# Patient Record
Sex: FEMALE | Race: BLACK OR AFRICAN AMERICAN | NOT HISPANIC OR LATINO | Employment: FULL TIME | URBAN - METROPOLITAN AREA
[De-identification: names, ages, dates, MRNs, and addresses within clinical notes are randomized per-mention and may not be internally consistent; named-entity substitution may affect disease eponyms.]

---

## 2020-12-17 ENCOUNTER — OFFICE VISIT (OUTPATIENT)
Dept: FAMILY MEDICINE | Facility: CLINIC | Age: 30
End: 2020-12-17
Payer: COMMERCIAL

## 2020-12-17 VITALS
TEMPERATURE: 97 F | DIASTOLIC BLOOD PRESSURE: 70 MMHG | SYSTOLIC BLOOD PRESSURE: 102 MMHG | HEIGHT: 62 IN | HEART RATE: 77 BPM | BODY MASS INDEX: 27.1 KG/M2 | OXYGEN SATURATION: 98 % | WEIGHT: 147.25 LBS

## 2020-12-17 DIAGNOSIS — Z00.00 ANNUAL PHYSICAL EXAM: Primary | ICD-10-CM

## 2020-12-17 DIAGNOSIS — M54.9 BACK PAIN, UNSPECIFIED BACK LOCATION, UNSPECIFIED BACK PAIN LATERALITY, UNSPECIFIED CHRONICITY: ICD-10-CM

## 2020-12-17 DIAGNOSIS — Z11.59 SCREENING FOR VIRAL DISEASE: ICD-10-CM

## 2020-12-17 DIAGNOSIS — R51.9 NONINTRACTABLE EPISODIC HEADACHE, UNSPECIFIED HEADACHE TYPE: ICD-10-CM

## 2020-12-17 DIAGNOSIS — M54.9 DORSALGIA, UNSPECIFIED: ICD-10-CM

## 2020-12-17 DIAGNOSIS — J30.9 ALLERGIC RHINITIS, UNSPECIFIED SEASONALITY, UNSPECIFIED TRIGGER: ICD-10-CM

## 2020-12-17 PROCEDURE — 1125F AMNT PAIN NOTED PAIN PRSNT: CPT | Mod: S$GLB,,, | Performed by: INTERNAL MEDICINE

## 2020-12-17 PROCEDURE — 99204 OFFICE O/P NEW MOD 45 MIN: CPT | Mod: 25,S$GLB,, | Performed by: INTERNAL MEDICINE

## 2020-12-17 PROCEDURE — 99204 PR OFFICE/OUTPT VISIT, NEW, LEVL IV, 45-59 MIN: ICD-10-PCS | Mod: 25,S$GLB,, | Performed by: INTERNAL MEDICINE

## 2020-12-17 PROCEDURE — 96372 PR INJECTION,THERAP/PROPH/DIAG2ST, IM OR SUBCUT: ICD-10-PCS | Mod: S$GLB,,, | Performed by: INTERNAL MEDICINE

## 2020-12-17 PROCEDURE — 99999 PR PBB SHADOW E&M-NEW PATIENT-LVL III: CPT | Mod: PBBFAC,,, | Performed by: INTERNAL MEDICINE

## 2020-12-17 PROCEDURE — 99999 PR PBB SHADOW E&M-NEW PATIENT-LVL III: ICD-10-PCS | Mod: PBBFAC,,, | Performed by: INTERNAL MEDICINE

## 2020-12-17 PROCEDURE — 3008F PR BODY MASS INDEX (BMI) DOCUMENTED: ICD-10-PCS | Mod: CPTII,S$GLB,, | Performed by: INTERNAL MEDICINE

## 2020-12-17 PROCEDURE — 1125F PR PAIN SEVERITY QUANTIFIED, PAIN PRESENT: ICD-10-PCS | Mod: S$GLB,,, | Performed by: INTERNAL MEDICINE

## 2020-12-17 PROCEDURE — 96372 THER/PROPH/DIAG INJ SC/IM: CPT | Mod: S$GLB,,, | Performed by: INTERNAL MEDICINE

## 2020-12-17 PROCEDURE — 3008F BODY MASS INDEX DOCD: CPT | Mod: CPTII,S$GLB,, | Performed by: INTERNAL MEDICINE

## 2020-12-17 RX ORDER — FLUTICASONE PROPIONATE 50 MCG
1 SPRAY, SUSPENSION (ML) NASAL DAILY
Qty: 16 G | Refills: 11 | OUTPATIENT
Start: 2020-12-17 | End: 2023-02-01

## 2020-12-17 RX ORDER — TRIAMCINOLONE ACETONIDE 40 MG/ML
40 INJECTION, SUSPENSION INTRA-ARTICULAR; INTRAMUSCULAR ONCE
Status: COMPLETED | OUTPATIENT
Start: 2020-12-17 | End: 2020-12-17

## 2020-12-17 RX ADMIN — TRIAMCINOLONE ACETONIDE 40 MG: 40 INJECTION, SUSPENSION INTRA-ARTICULAR; INTRAMUSCULAR at 09:12

## 2020-12-17 NOTE — PROGRESS NOTES
[facility] Note    Chief Complaint      Chief Complaint   Patient presents with    Back Pain     med back mostly left side    Headache     every other day       History of Present Illness      Jd Dwyer is a 30 y.o. female with no chronic conditions who presents today for: establish care and complaints of back pain and headache.  Complains of left lower back pain, nonradiating, present for the past few months, lasts throughout the day and can last days at a time when exacerbated, occurs most days.  Has tried tylenol and ibuprofen which has not been effective even in short term.  No inciting injury has been recalled.  Has occurred years ago, but resolved spontaneously until recently.    Complains of frontal headaches, throbbing in character, occurs every other day, not associated with visual disturbance, nausea, vomiting, sensitivity to light or sound.  Temporarily improved with tylenol and advil but recurs.  Present for years.  Does not take anything for allergic rhinitis.  Occasional itchy eyes but denies runny nose, sneezing, postnasal drip, cough, ear fullness.    Diet: Prepares own foods mostly.  Does fairly well limiting fatty foods and carbs.  Drinks plenty water.  Exercise: stays active during the day.     Denies drinking and driving, drinking more than 4 drinks on occasion, drug use.     Flu shot defers today.  TdAP UTD 2016.    PAP smear UTD Dr. Valdivia.    Past Medical History:  History reviewed. No pertinent past medical history.    Past Surgical History:   has a past surgical history that includes  section.    Family History:  family history includes No Known Problems in her father and mother.     Social History:  Social History     Tobacco Use    Smoking status: Never Smoker   Substance Use Topics    Alcohol use: Yes     Comment: occasionally    Drug use: No       I personally reviewed all past medical, surgical, social and family history.    Review of Systems  "  Constitutional: Negative for chills, fever and malaise/fatigue.   Respiratory: Negative for shortness of breath.    Cardiovascular: Negative for chest pain.   Gastrointestinal: Negative for constipation, diarrhea, nausea and vomiting.   Skin: Negative for rash.   Neurological: Negative for weakness.   All other systems reviewed and are negative.       Medications:  Outpatient Encounter Medications as of 12/17/2020   Medication Sig Dispense Refill    ibuprofen (ADVIL,MOTRIN) 600 MG tablet Take 1 tablet (600 mg total) by mouth every 6 (six) hours as needed for Pain. (Patient not taking: Reported on 12/17/2020) 20 tablet 0    naproxen (NAPROSYN) 500 MG tablet Take 1 tablet (500 mg total) by mouth 2 (two) times daily as needed. (Patient not taking: Reported on 12/17/2020) 60 tablet 0    prenatal vit no.91-iron-FA-dha 28 mg iron- 975 mcg-200 mg Cmpk Take 1 tablet by mouth once daily.       Facility-Administered Encounter Medications as of 12/17/2020   Medication Dose Route Frequency Provider Last Rate Last Dose    triamcinolone acetonide injection 40 mg  40 mg Intramuscular Once Hernan Glasgow MD           Allergies:  Review of patient's allergies indicates:  No Known Allergies    Health Maintenance:  Immunization History   Administered Date(s) Administered    Rho (D) Immune Globulin - IM 03/17/2016      Health Maintenance   Topic Date Due    Hepatitis C Screening  1990    Lipid Panel  1988    TETANUS VACCINE  06/09/2006        Physical Exam      Vital Signs  Temp: 97.1 °F (36.2 °C)  Temp src: Oral  Pulse: 77  SpO2: 98 %  BP: 102/70  BP Location: Right arm  Patient Position: Sitting  Pain Score:   7  Pain Loc: Back  Height and Weight  Height: 5' 2" (157.5 cm)  Weight: 66.8 kg (147 lb 4.3 oz)  BSA (Calculated - sq m): 1.71 sq meters  BMI (Calculated): 26.9  Weight in (lb) to have BMI = 25: 136.4]    Physical Exam  Vitals signs reviewed.   Constitutional:       Appearance: She is well-developed. "   HENT:      Head: Normocephalic and atraumatic.      Right Ear: External ear normal.      Left Ear: External ear normal.   Eyes:      Conjunctiva/sclera: Conjunctivae normal.      Pupils: Pupils are equal, round, and reactive to light.   Neck:      Vascular: No carotid bruit.   Cardiovascular:      Rate and Rhythm: Normal rate and regular rhythm.      Heart sounds: Normal heart sounds. No murmur.   Pulmonary:      Effort: Pulmonary effort is normal.      Breath sounds: Normal breath sounds. No wheezing or rales.   Abdominal:      General: Bowel sounds are normal. There is no distension.      Palpations: Abdomen is soft.      Tenderness: There is no abdominal tenderness.          Laboratory:  CBC:      CMP:        Invalid input(s): CREATININ  URINALYSIS:       LIPIDS:      TSH:      A1C:        Assessment/Plan     Jd Dwyer is a 30 y.o.female with:    1. Back pain, unspecified back location, unspecified back pain laterality, unspecified chronicity  - triamcinolone acetonide injection 40 mg  Will check Xray and give kenalog shot.  Pending results, will likely refer to PT for stretching exercise.  2. Nonintractable episodic headache, unspecified headache type  - triamcinolone acetonide injection 40 mg  Starting on nasal steroid for likely sinus headaches.  4. Annual physical exam  - CBC Auto Differential; Future  - Comprehensive Metabolic Panel; Future  - Hepatitis C Antibody; Future  - Lipid Panel; Future  - TSH; Future  - T4, Free; Future    5. Screening for viral disease  - Hepatitis C Antibody; Future           Chronic conditions status updated as per HPI.  Other than changes above, cont current medications and maintain follow up with specialists.  Return to clinic in 12 months.    Hernan Glasgow MD  [facility]

## 2020-12-18 ENCOUNTER — TELEPHONE (OUTPATIENT)
Dept: ADMINISTRATIVE | Facility: HOSPITAL | Age: 30
End: 2020-12-18

## 2020-12-21 ENCOUNTER — PATIENT MESSAGE (OUTPATIENT)
Dept: PRIMARY CARE CLINIC | Facility: CLINIC | Age: 30
End: 2020-12-21

## 2020-12-30 ENCOUNTER — PATIENT MESSAGE (OUTPATIENT)
Dept: FAMILY MEDICINE | Facility: CLINIC | Age: 30
End: 2020-12-30

## 2022-01-10 ENCOUNTER — PATIENT MESSAGE (OUTPATIENT)
Dept: ADMINISTRATIVE | Facility: HOSPITAL | Age: 32
End: 2022-01-10
Payer: COMMERCIAL

## 2022-05-05 ENCOUNTER — PATIENT OUTREACH (OUTPATIENT)
Dept: ADMINISTRATIVE | Facility: HOSPITAL | Age: 32
End: 2022-05-05
Payer: COMMERCIAL

## 2022-05-05 NOTE — PROGRESS NOTES
Health Maintenance Due   Topic Date Due    Cervical Cancer Screening  Never done    TETANUS VACCINE  Never done     Triggered LINKS. Updated Care Everywhere. Checked for outside lab results in [facility] [employer]. Scanned & hyperlinked outside lab results for lipid panel into . Chart review completed.

## 2022-06-24 LAB
HUMAN PAPILLOMAVIRUS (HPV): NORMAL
PAP RECOMMENDATION EXT: NORMAL

## 2022-08-10 ENCOUNTER — OFFICE VISIT (OUTPATIENT)
Dept: INTERNAL MEDICINE | Facility: CLINIC | Age: 32
End: 2022-08-10
Payer: COMMERCIAL

## 2022-08-10 VITALS
OXYGEN SATURATION: 99 % | SYSTOLIC BLOOD PRESSURE: 110 MMHG | HEIGHT: 62 IN | DIASTOLIC BLOOD PRESSURE: 82 MMHG | TEMPERATURE: 99 F | BODY MASS INDEX: 27.63 KG/M2 | HEART RATE: 88 BPM | WEIGHT: 150.13 LBS

## 2022-08-10 DIAGNOSIS — F41.9 ANXIETY: Primary | ICD-10-CM

## 2022-08-10 DIAGNOSIS — R51.9 NONINTRACTABLE EPISODIC HEADACHE, UNSPECIFIED HEADACHE TYPE: ICD-10-CM

## 2022-08-10 DIAGNOSIS — M54.50 CHRONIC BILATERAL LOW BACK PAIN WITHOUT SCIATICA: ICD-10-CM

## 2022-08-10 DIAGNOSIS — M54.2 CHRONIC NECK PAIN: ICD-10-CM

## 2022-08-10 DIAGNOSIS — G89.29 CHRONIC BILATERAL LOW BACK PAIN WITHOUT SCIATICA: ICD-10-CM

## 2022-08-10 DIAGNOSIS — G89.29 CHRONIC NECK PAIN: ICD-10-CM

## 2022-08-10 PROCEDURE — 99214 PR OFFICE/OUTPT VISIT, EST, LEVL IV, 30-39 MIN: ICD-10-PCS | Mod: S$GLB,,, | Performed by: INTERNAL MEDICINE

## 2022-08-10 PROCEDURE — 99999 PR PBB SHADOW E&M-EST. PATIENT-LVL IV: ICD-10-PCS | Mod: PBBFAC,,, | Performed by: INTERNAL MEDICINE

## 2022-08-10 PROCEDURE — 3074F PR MOST RECENT SYSTOLIC BLOOD PRESSURE < 130 MM HG: ICD-10-PCS | Mod: CPTII,S$GLB,, | Performed by: INTERNAL MEDICINE

## 2022-08-10 PROCEDURE — 99999 PR PBB SHADOW E&M-EST. PATIENT-LVL IV: CPT | Mod: PBBFAC,,, | Performed by: INTERNAL MEDICINE

## 2022-08-10 PROCEDURE — 1159F PR MEDICATION LIST DOCUMENTED IN MEDICAL RECORD: ICD-10-PCS | Mod: CPTII,S$GLB,, | Performed by: INTERNAL MEDICINE

## 2022-08-10 PROCEDURE — 3079F PR MOST RECENT DIASTOLIC BLOOD PRESSURE 80-89 MM HG: ICD-10-PCS | Mod: CPTII,S$GLB,, | Performed by: INTERNAL MEDICINE

## 2022-08-10 PROCEDURE — 3008F PR BODY MASS INDEX (BMI) DOCUMENTED: ICD-10-PCS | Mod: CPTII,S$GLB,, | Performed by: INTERNAL MEDICINE

## 2022-08-10 PROCEDURE — 99214 OFFICE O/P EST MOD 30 MIN: CPT | Mod: S$GLB,,, | Performed by: INTERNAL MEDICINE

## 2022-08-10 PROCEDURE — 1159F MED LIST DOCD IN RCRD: CPT | Mod: CPTII,S$GLB,, | Performed by: INTERNAL MEDICINE

## 2022-08-10 PROCEDURE — 3074F SYST BP LT 130 MM HG: CPT | Mod: CPTII,S$GLB,, | Performed by: INTERNAL MEDICINE

## 2022-08-10 PROCEDURE — 3008F BODY MASS INDEX DOCD: CPT | Mod: CPTII,S$GLB,, | Performed by: INTERNAL MEDICINE

## 2022-08-10 PROCEDURE — 3079F DIAST BP 80-89 MM HG: CPT | Mod: CPTII,S$GLB,, | Performed by: INTERNAL MEDICINE

## 2022-08-10 RX ORDER — METHYLPREDNISOLONE 4 MG/1
TABLET ORAL
Qty: 21 EACH | Refills: 0 | Status: SHIPPED | OUTPATIENT
Start: 2022-08-10 | End: 2022-08-31

## 2022-08-10 RX ORDER — VENLAFAXINE HYDROCHLORIDE 37.5 MG/1
37.5 CAPSULE, EXTENDED RELEASE ORAL DAILY
Qty: 30 CAPSULE | Refills: 11 | Status: SHIPPED | OUTPATIENT
Start: 2022-08-10 | End: 2023-04-26

## 2022-08-10 NOTE — PROGRESS NOTES
[facility] Note    Chief Complaint      Chief Complaint   Patient presents with    Headache     Mainly on the left side    Back Pain       History of Present Illness      Jd Dwyer is a 32 y.o. female with chronic conditions of chronic neck and back pain, headaches who presents today for: back pain and headaches.  Had lumbar xray in  which was unremarkable.  Has been having diffuse back pain, mild to moderate in intensity, constant for the most part.  Seems worse on left side.  Has also had onset of right hip and knee pain, possible from compensating for left back and neck pain.  Recently started with left frontal headaches.  Denies nasal congestion, post nasal drip, cough.  Complains of increased anxiety, restlessness and insomnia    Past Medical History:  History reviewed. No pertinent past medical history.    Past Surgical History:   has a past surgical history that includes  section.    Family History:  family history includes Diabetes in her maternal grandmother; No Known Problems in her father and mother.     Social History:  Social History     Tobacco Use    Smoking status: Never Smoker    Smokeless tobacco: Never Used   Substance Use Topics    Alcohol use: Yes     Alcohol/week: 6.0 standard drinks     Types: 3 Glasses of wine, 3 Standard drinks or equivalent per week     Comment: occasionally    Drug use: No       I personally reviewed all past medical, surgical, social and family history.    Review of Systems   Constitutional: Negative for fever and weight loss.   Cardiovascular: Positive for chest pain.   Gastrointestinal: Negative for abdominal pain.   Genitourinary: Negative for dysuria and hematuria.   Neurological: Positive for headaches. Negative for tingling and weakness.        Medications:  Outpatient Encounter Medications as of 8/10/2022   Medication Sig Dispense Refill    fluticasone propionate (FLONASE) 50 mcg/actuation nasal spray 1 spray (50 mcg  "total) by Each Nostril route once daily. 16 g 11    ibuprofen (ADVIL,MOTRIN) 600 MG tablet Take 1 tablet (600 mg total) by mouth every 6 (six) hours as needed for Pain. (Patient not taking: Reported on 12/17/2020) 20 tablet 0    methylPREDNISolone (MEDROL DOSEPACK) 4 mg tablet use as directed 21 each 0    naproxen (NAPROSYN) 500 MG tablet Take 1 tablet (500 mg total) by mouth 2 (two) times daily as needed. (Patient not taking: Reported on 12/17/2020) 60 tablet 0    prenatal vit no.91-iron-FA-dha 28 mg iron- 975 mcg-200 mg Cmpk Take 1 tablet by mouth once daily.      venlafaxine (EFFEXOR-XR) 37.5 MG 24 hr capsule Take 1 capsule (37.5 mg total) by mouth once daily. 30 capsule 11     No facility-administered encounter medications on file as of 8/10/2022.       Allergies:  Review of patient's allergies indicates:  No Known Allergies    Health Maintenance:  Immunization History   Administered Date(s) Administered    COVID-19, MRNA, LN-S, PF (Pfizer) (Gray Cap) 03/10/2022    COVID-19, MRNA, LN-S, PF (Pfizer) (Purple Cap) 08/19/2021, 10/07/2021    Rho (D) Immune Globulin - IM 03/17/2016      Health Maintenance   Topic Date Due    TETANUS VACCINE  Never done    Hepatitis C Screening  Completed    Lipid Panel  Completed        Physical Exam      Vital Signs  Temp: 98.8 °F (37.1 °C)  Temp src: Oral  Pulse: 88  SpO2: 99 %  BP: 110/82  BP Location: Right arm  Patient Position: Sitting  Pain Score:   8  Height and Weight  Height: 5' 2" (157.5 cm)  Weight: 68.1 kg (150 lb 2.1 oz)  BSA (Calculated - sq m): 1.73 sq meters  BMI (Calculated): 27.5  Weight in (lb) to have BMI = 25: 136.4]    Physical Exam  Vitals reviewed.   Constitutional:       Appearance: She is well-developed.   HENT:      Head: Normocephalic and atraumatic.      Right Ear: External ear normal.      Left Ear: External ear normal.   Cardiovascular:      Rate and Rhythm: Normal rate and regular rhythm.      Heart sounds: Normal heart sounds. No murmur " heard.  Pulmonary:      Effort: Pulmonary effort is normal.      Breath sounds: Normal breath sounds. No wheezing or rales.   Abdominal:      General: Bowel sounds are normal. There is no distension.      Palpations: Abdomen is soft.      Tenderness: There is no abdominal tenderness.          Laboratory:  CBC:  Recent Labs   Lab 12/17/20  0956   WBC 6.43   RBC 4.58   Hemoglobin 13.1   Hematocrit 42.3   Platelets 264   MCV 92   MCH 28.6   MCHC 31.0 L     CMP:  Recent Labs   Lab 12/17/20  0956   Glucose 103   Calcium 10.0   Albumin 4.5   Total Protein 8.5 H   Sodium 145   Potassium 5.1   CO2 28   Chloride 107   BUN 16   Alkaline Phosphatase 54   ALT 15   AST 34   Total Bilirubin 0.4     URINALYSIS:       LIPIDS:  Recent Labs   Lab 12/17/20  0956   TSH 1.030   HDL 63   Cholesterol 171   Triglycerides 67   LDL Cholesterol 94.6   HDL/Cholesterol Ratio 36.8   Non-HDL Cholesterol 108   Total Cholesterol/HDL Ratio 2.7     TSH:  Recent Labs   Lab 12/17/20  0956   TSH 1.030     A1C:        Assessment/Plan     Jd Dwyer is a 32 y.o.female with:    1. Anxiety  - venlafaxine (EFFEXOR-XR) 37.5 MG 24 hr capsule; Take 1 capsule (37.5 mg total) by mouth once daily.  Dispense: 30 capsule; Refill: 11  Start effexor.  May need to increase dose.  Monitor for side effects.  2. Nonintractable episodic headache, unspecified headache type  - methylPREDNISolone (MEDROL DOSEPACK) 4 mg tablet; use as directed  Dispense: 21 each; Refill: 0  Likely sinusitis.  Sending in medrol  3. Chronic bilateral low back pain without sciatica  - Ambulatory referral/consult to Physical/Occupational Therapy; Future  4. Chronic neck pain  - Ambulatory referral/consult to Physical/Occupational Therapy; Future  - X-Ray Cervical Spine Complete 5 view; Future  Check Xray cervical spine.  Referring to PT for evaluation.     Chronic conditions status updated as per HPI.  Other than changes above, cont current medications and maintain follow up with  specialists.  No follow-ups on file.    Future Appointments   Date Time Provider Department Center   8/10/2022  4:15 PM DES[facility] OIC XR1 DESH XRAY Rob Glasgow MD  [facility]                  Answers for HPI/ROS submitted by the patient on 8/8/2022  Chronicity: chronic  Onset: more than 1 year ago  Frequency: constantly  Progression since onset: unchanged  Pain location: lumbar spine, costovertebral angle  Pain quality: aching  Radiates to: right knee, right thigh  Pain - numeric: 8/10  Pain is: the same all the time  Aggravated by: position, lying down, sitting, standing, stress  Stiffness is present: at night  bladder incontinence: No  bowel incontinence: No  leg pain: Yes  numbness: Yes  paresis: No  paresthesias: No  pelvic pain: No  perianal numbness: No  genital pain: No  Pain severity: severe  Improvement on treatment: no relief

## 2022-08-11 ENCOUNTER — PATIENT MESSAGE (OUTPATIENT)
Dept: INTERNAL MEDICINE | Facility: CLINIC | Age: 32
End: 2022-08-11
Payer: COMMERCIAL

## 2023-01-19 LAB
CHOLEST SERPL-MSCNC: 165 MG/DL (ref 0–200)
HDLC SERPL-MCNC: 65 MG/DL (ref 35–70)
LDLC SERPL CALC-MCNC: 72 MG/DL (ref 0–160)
TRIGL SERPL-MCNC: 136 MG/DL (ref 40–160)

## 2023-02-01 ENCOUNTER — TELEPHONE (OUTPATIENT)
Dept: INTERNAL MEDICINE | Facility: CLINIC | Age: 33
End: 2023-02-01
Payer: COMMERCIAL

## 2023-02-01 ENCOUNTER — PATIENT OUTREACH (OUTPATIENT)
Dept: ADMINISTRATIVE | Facility: HOSPITAL | Age: 33
End: 2023-02-01
Payer: COMMERCIAL

## 2023-02-01 VITALS — SYSTOLIC BLOOD PRESSURE: 101 MMHG | DIASTOLIC BLOOD PRESSURE: 70 MMHG

## 2023-02-01 NOTE — PROGRESS NOTES
Health Maintenance Due   Topic Date Due    TETANUS VACCINE  Never done    Influenza Vaccine (1) Never done     Triggered LINKS and reconciled immunizations. Updated Care Everywhere. Imported outside lab results for lipid panel and BP reading received from [facility] into . Checked for outside lab results in [facility]. Imported outside pap smear results from [employer] into . Chart review completed.

## 2023-04-26 ENCOUNTER — OFFICE VISIT (OUTPATIENT)
Dept: PRIMARY CARE CLINIC | Facility: CLINIC | Age: 33
End: 2023-04-26
Payer: COMMERCIAL

## 2023-04-26 ENCOUNTER — TELEPHONE (OUTPATIENT)
Dept: PAIN MEDICINE | Facility: CLINIC | Age: 33
End: 2023-04-26
Payer: COMMERCIAL

## 2023-04-26 VITALS
HEIGHT: 62 IN | HEART RATE: 86 BPM | OXYGEN SATURATION: 98 % | BODY MASS INDEX: 27.75 KG/M2 | SYSTOLIC BLOOD PRESSURE: 120 MMHG | DIASTOLIC BLOOD PRESSURE: 70 MMHG | WEIGHT: 150.81 LBS

## 2023-04-26 DIAGNOSIS — F41.9 ANXIETY: ICD-10-CM

## 2023-04-26 DIAGNOSIS — G44.86 CERVICOGENIC HEADACHE: ICD-10-CM

## 2023-04-26 DIAGNOSIS — K21.9 GERD WITHOUT ESOPHAGITIS: ICD-10-CM

## 2023-04-26 DIAGNOSIS — R07.9 CHEST PAIN, UNSPECIFIED TYPE: ICD-10-CM

## 2023-04-26 DIAGNOSIS — M54.9 DORSALGIA, UNSPECIFIED: Primary | ICD-10-CM

## 2023-04-26 PROCEDURE — 1159F MED LIST DOCD IN RCRD: CPT | Mod: CPTII,S$GLB,, | Performed by: INTERNAL MEDICINE

## 2023-04-26 PROCEDURE — 1159F PR MEDICATION LIST DOCUMENTED IN MEDICAL RECORD: ICD-10-PCS | Mod: CPTII,S$GLB,, | Performed by: INTERNAL MEDICINE

## 2023-04-26 PROCEDURE — 99999 PR PBB SHADOW E&M-EST. PATIENT-LVL IV: CPT | Mod: PBBFAC,,, | Performed by: INTERNAL MEDICINE

## 2023-04-26 PROCEDURE — 3008F BODY MASS INDEX DOCD: CPT | Mod: CPTII,S$GLB,, | Performed by: INTERNAL MEDICINE

## 2023-04-26 PROCEDURE — 3074F PR MOST RECENT SYSTOLIC BLOOD PRESSURE < 130 MM HG: ICD-10-PCS | Mod: CPTII,S$GLB,, | Performed by: INTERNAL MEDICINE

## 2023-04-26 PROCEDURE — 3078F DIAST BP <80 MM HG: CPT | Mod: CPTII,S$GLB,, | Performed by: INTERNAL MEDICINE

## 2023-04-26 PROCEDURE — 99999 PR PBB SHADOW E&M-EST. PATIENT-LVL IV: ICD-10-PCS | Mod: PBBFAC,,, | Performed by: INTERNAL MEDICINE

## 2023-04-26 PROCEDURE — 99214 OFFICE O/P EST MOD 30 MIN: CPT | Mod: S$GLB,,, | Performed by: INTERNAL MEDICINE

## 2023-04-26 PROCEDURE — 3074F SYST BP LT 130 MM HG: CPT | Mod: CPTII,S$GLB,, | Performed by: INTERNAL MEDICINE

## 2023-04-26 PROCEDURE — 3078F PR MOST RECENT DIASTOLIC BLOOD PRESSURE < 80 MM HG: ICD-10-PCS | Mod: CPTII,S$GLB,, | Performed by: INTERNAL MEDICINE

## 2023-04-26 PROCEDURE — 99214 PR OFFICE/OUTPT VISIT, EST, LEVL IV, 30-39 MIN: ICD-10-PCS | Mod: S$GLB,,, | Performed by: INTERNAL MEDICINE

## 2023-04-26 PROCEDURE — 3008F PR BODY MASS INDEX (BMI) DOCUMENTED: ICD-10-PCS | Mod: CPTII,S$GLB,, | Performed by: INTERNAL MEDICINE

## 2023-04-26 RX ORDER — OMEPRAZOLE 40 MG/1
40 CAPSULE, DELAYED RELEASE ORAL EVERY MORNING
Qty: 30 CAPSULE | Refills: 1 | Status: SHIPPED | OUTPATIENT
Start: 2023-04-26 | End: 2024-03-20 | Stop reason: ALTCHOICE

## 2023-04-26 RX ORDER — TIZANIDINE 4 MG/1
4 TABLET ORAL EVERY 6 HOURS PRN
Qty: 30 TABLET | Refills: 0 | Status: SHIPPED | OUTPATIENT
Start: 2023-04-26 | End: 2024-03-26

## 2023-04-26 RX ORDER — ESCITALOPRAM OXALATE 10 MG/1
10 TABLET ORAL DAILY
Qty: 30 TABLET | Refills: 11 | Status: SHIPPED | OUTPATIENT
Start: 2023-04-26 | End: 2024-03-26

## 2023-04-26 NOTE — PROGRESS NOTES
[facility] Note    Chief Complaint      Chief Complaint   Patient presents with    Back Pain    Leg Pain     left    Headache    Gastroesophageal Reflux       History of Present Illness      Jd Dwyer is a 32 y.o. female with chronic conditions of chronic neck and back pain, headaches who presents today for: complaints.  Complains of persistent, intermittent neck pain and posterior headache.  Complains of persistent low back pain and left sciatica.  Has had imaging in the recent past for this.  Has had trial of PT without improvement.  Complains of epigastric discomfort radiating to the back, heartburn.    Past Medical History:  History reviewed. No pertinent past medical history.    Past Surgical History:   has a past surgical history that includes  section.    Family History:  family history includes Diabetes in her maternal grandmother; No Known Problems in her father and mother.     Social History:  Social History     Tobacco Use    Smoking status: Never    Smokeless tobacco: Never   Substance Use Topics    Alcohol use: Yes     Alcohol/week: 6.0 standard drinks     Types: 3 Glasses of wine, 3 Standard drinks or equivalent per week     Comment: occasionally    Drug use: No       I personally reviewed all past medical, surgical, social and family history.    Review of Systems   Constitutional:  Negative for chills, fever and malaise/fatigue.   Respiratory:  Negative for shortness of breath.    Cardiovascular:  Negative for chest pain.   Gastrointestinal:  Negative for constipation, diarrhea, nausea and vomiting.   Skin:  Negative for rash.   Neurological:  Negative for weakness.   All other systems reviewed and are negative.     Medications:  Outpatient Encounter Medications as of 2023   Medication Sig Dispense Refill    ibuprofen (ADVIL,MOTRIN) 600 MG tablet Take 1 tablet (600 mg total) by mouth every 6 (six) hours as needed for Pain. (Patient not taking: Reported on  "12/17/2020) 20 tablet 0    omeprazole (PRILOSEC) 40 MG capsule Take 1 capsule (40 mg total) by mouth every morning. 30 capsule 1    prenatal vit no.91-iron-FA-dha 28 mg iron- 975 mcg-200 mg Cmpk Take 1 tablet by mouth once daily.      tiZANidine (ZANAFLEX) 4 MG tablet Take 1 tablet (4 mg total) by mouth every 6 (six) hours as needed. 30 tablet 0    venlafaxine (EFFEXOR-XR) 37.5 MG 24 hr capsule Take 1 capsule (37.5 mg total) by mouth once daily. 30 capsule 11     No facility-administered encounter medications on file as of 4/26/2023.       Allergies:  Review of patient's allergies indicates:  No Known Allergies    Health Maintenance:  Immunization History   Administered Date(s) Administered    COVID-19, MRNA, LN-S, PF (Pfizer) (Gray Cap) 03/10/2022    COVID-19, MRNA, LN-S, PF (Pfizer) (Purple Cap) 08/19/2021, 10/07/2021    COVID-19, mRNA, LNP-S, bivalent booster, PF (Moderna Omicron) 10/21/2022    Rho (D) Immune Globulin - IM 03/17/2016      Health Maintenance   Topic Date Due    TETANUS VACCINE  Never done    Hepatitis C Screening  Completed    Lipid Panel  Completed        Physical Exam      Vital Signs  Pulse: 86  SpO2: 98 %  BP: 120/70  BP Location: Right arm  Patient Position: Sitting  Pain Score:   9  Pain Loc: Generalized  Height and Weight  Height: 5' 2" (157.5 cm)  Weight: 68.4 kg (150 lb 12.7 oz)  BSA (Calculated - sq m): 1.73 sq meters  BMI (Calculated): 27.6  Weight in (lb) to have BMI = 25: 136.4]    Physical Exam  Vitals reviewed.   Constitutional:       Appearance: She is well-developed.   HENT:      Head: Normocephalic and atraumatic.      Right Ear: External ear normal.      Left Ear: External ear normal.   Cardiovascular:      Rate and Rhythm: Normal rate and regular rhythm.      Heart sounds: Normal heart sounds. No murmur heard.  Pulmonary:      Effort: Pulmonary effort is normal.      Breath sounds: Normal breath sounds. No wheezing or rales.   Abdominal:      General: Bowel sounds are normal. " There is no distension.      Palpations: Abdomen is soft.      Tenderness: There is no abdominal tenderness.        Laboratory:  CBC:  Recent Labs   Lab 12/17/20  0956   WBC 6.43   RBC 4.58   Hemoglobin 13.1   Hematocrit 42.3   Platelets 264   MCV 92   MCH 28.6   MCHC 31.0 L     CMP:  Recent Labs   Lab 12/17/20  0956   Glucose 103   Calcium 10.0   Albumin 4.5   Total Protein 8.5 H   Sodium 145   Potassium 5.1   CO2 28   Chloride 107   BUN 16   Alkaline Phosphatase 54   ALT 15   AST 34   Total Bilirubin 0.4     URINALYSIS:       LIPIDS:  Recent Labs   Lab 12/17/20  0956 01/19/23  0000   TSH 1.030  --    HDL 63 65   Cholesterol 171 165   Triglycerides 67 136   LDL Calculated  --  72   LDL Cholesterol 94.6  --    HDL/Cholesterol Ratio 36.8  --    Non-HDL Cholesterol 108  --    Total Cholesterol/HDL Ratio 2.7  --      TSH:  Recent Labs   Lab 12/17/20  0956   TSH 1.030     A1C:        Assessment/Plan     Jd Dwyer is a 32 y.o.female with:    1. Dorsalgia, unspecified  - MRI Lumbar Spine Without Contrast; Future  - Ambulatory referral/consult to Pain Clinic; Future  Reviewed previous xrays.  Will get MRI to set up for pain management evaluation.  2. Chest pain, unspecified type  LIkely GERD.  See below  3. Cervicogenic headache  - tiZANidine (ZANAFLEX) 4 MG tablet; Take 1 tablet (4 mg total) by mouth every 6 (six) hours as needed.  Dispense: 30 tablet; Refill: 0  Start on muscle relaxer trial to see if reducing tension improves symptoms.  Also recommend stretching.   4. GERD without esophagitis  - omeprazole (PRILOSEC) 40 MG capsule; Take 1 capsule (40 mg total) by mouth every morning.  Dispense: 30 capsule; Refill: 1  STart on 4-6 week course of PPI to see if symptoms improve.    Chronic conditions status updated as per HPI.  Other than changes above, cont current medications and maintain follow up with specialists.  No follow-ups on file.    No future appointments.    Hernan Glasgow MD  [facility]                Answers submitted by the patient for this visit:  Back Pain Questionnaire (Submitted on 4/26/2023)  Chief Complaint: Back pain  Chronicity: recurrent  Onset: more than 1 year ago  Frequency: constantly  Progression since onset: waxing and waning  Pain location: lumbar spine  Pain quality: aching, burning, shooting  Radiates to: left foot, left knee, left thigh  Pain - numeric: 9/10  Pain is: the same all the time  Aggravated by: position, lying down, sitting, standing, stress  Stiffness is present: all day  bladder incontinence: No  bowel incontinence: No  leg pain: Yes  numbness: Yes  paresis: No  paresthesias: No  pelvic pain: Yes  perianal numbness: No  genital pain: No  Pain severity: severe  Improvement on treatment: no relief

## 2023-04-26 NOTE — TELEPHONE ENCOUNTER
Contacted pt. In regards to appointment scheduled for 05/04/2023, appointment needs to be scheduled, Dr. Mccarthy will be in surgery on this day.  Contacted pt. Pt was unavaialble, left vm to contact [facility] for rescheduling.

## 2023-06-28 ENCOUNTER — HOSPITAL ENCOUNTER (EMERGENCY)
Facility: HOSPITAL | Age: 33
Discharge: HOME | End: 2023-06-28
Attending: EMERGENCY MEDICINE
Payer: COMMERCIAL

## 2023-06-28 VITALS
TEMPERATURE: 99 F | OXYGEN SATURATION: 99 % | WEIGHT: 150 LBS | HEART RATE: 72 BPM | SYSTOLIC BLOOD PRESSURE: 117 MMHG | DIASTOLIC BLOOD PRESSURE: 68 MMHG | HEIGHT: 62 IN | BODY MASS INDEX: 27.6 KG/M2 | RESPIRATION RATE: 16 BRPM

## 2023-06-28 DIAGNOSIS — S39.012D STRAIN OF LUMBAR REGION, SUBSEQUENT ENCOUNTER: Primary | ICD-10-CM

## 2023-06-28 LAB
B-HCG UR QL: NEGATIVE
CTP QC/QA: YES

## 2023-06-28 PROCEDURE — 99284 EMERGENCY DEPT VISIT MOD MDM: CPT

## 2023-06-28 PROCEDURE — 25000003 PHARM REV CODE 250

## 2023-06-28 PROCEDURE — 63600175 PHARM REV CODE 636 W HCPCS

## 2023-06-28 PROCEDURE — 81025 URINE PREGNANCY TEST: CPT

## 2023-06-28 PROCEDURE — 96372 THER/PROPH/DIAG INJ SC/IM: CPT

## 2023-06-28 RX ORDER — KETOROLAC TROMETHAMINE 30 MG/ML
10 INJECTION, SOLUTION INTRAMUSCULAR; INTRAVENOUS
Status: COMPLETED | OUTPATIENT
Start: 2023-06-28 | End: 2023-06-28

## 2023-06-28 RX ORDER — LIDOCAINE 50 MG/G
1 PATCH TOPICAL
Status: DISCONTINUED | OUTPATIENT
Start: 2023-06-28 | End: 2023-06-29 | Stop reason: HOSPADM

## 2023-06-28 RX ORDER — NAPROXEN 500 MG/1
500 TABLET ORAL 2 TIMES DAILY
Qty: 10 TABLET | Refills: 0 | Status: SHIPPED | OUTPATIENT
Start: 2023-06-28 | End: 2023-07-03

## 2023-06-28 RX ORDER — DIAZEPAM 5 MG/1
5 TABLET ORAL
Status: COMPLETED | OUTPATIENT
Start: 2023-06-28 | End: 2023-06-28

## 2023-06-28 RX ADMIN — KETOROLAC TROMETHAMINE 10 MG: 30 INJECTION, SOLUTION INTRAMUSCULAR; INTRAVENOUS at 09:06

## 2023-06-28 RX ADMIN — LIDOCAINE 1 PATCH: 50 PATCH CUTANEOUS at 09:06

## 2023-06-28 RX ADMIN — DIAZEPAM 5 MG: 5 TABLET ORAL at 09:06

## 2023-06-29 NOTE — DISCHARGE INSTRUCTIONS
Thank you for coming to our Emergency Department today. It is important to remember that some problems or medical conditions are difficult to diagnose and may not be found or addressed during your Emergency Department visit.     Be sure to follow up with your primary care doctor and review all labs/imaging/tests that were performed during your ER visit with them. Some labs/imaging/tests may be outside of the normal range, and require non-emergent follow-up and/or further investigation/treatment/procedures/testing to help diagnose/exclude/prevent complications or other potentially serious medical conditions that were not discussed or addressed during your ER visit.    Please take all medications as directed. All medications may potentially have side-effects and it is impossible to predict which medications may give you side-effects or what side-effects (if any) they will give you.. If you feel that you are having a negative effect or side-effect of any medication you should immediately stop taking them and seek medical attention. If you feel that you are having a life-threatening reaction call 911.    Return to the ER with any questions/concerns, new/concerning symptoms, worsening or failure to improve.     Do not drive, swim, climb to height, take a bath, operate heavy machinery, drink alcohol or take potentially sedating medications, sign any legal documents or make any important decisions for 24 hours if you have received any pain medications, sedatives or mood altering drugs during your ER visit or within 24 hours of taking them if they have been prescribed to you.

## 2023-06-29 NOTE — ED PROVIDER NOTES
Encounter Date: 2023       History     Chief Complaint   Patient presents with    Back Pain     Left sided back pain off & on for a few years and pain going down left leg to foot x 1 week but has gotten worse overnight. Was scheduled for MRI by physician but had to cancel.    Leg Pain     33-year-old female with past medical history of chronic back pain now presenting with chief complaint of worsening back pain and numbness of her left foot.  Patient reports that she has chronic back pain and was supposed to receive outpatient MRI but missed the appointment.  Patient reports that her pain has been getting progressively worse and acutely worsened last week calling intensified pain of her left lower back associated with numbness of her left knee and dorsum of her left foot and 4th and 5th left toes.  Patient denies any bladder or bowel changes, numbness or pain down the back of her legs, fevers, trauma, or IV drug use.    The history is provided by the patient.   Review of patient's allergies indicates:  No Known Allergies  No past medical history on file.  Past Surgical History:   Procedure Laterality Date     SECTION       Family History   Problem Relation Age of Onset    No Known Problems Mother     No Known Problems Father     Diabetes Maternal Grandmother      Social History     Tobacco Use    Smoking status: Never    Smokeless tobacco: Never   Substance Use Topics    Alcohol use: Yes     Alcohol/week: 6.0 standard drinks     Types: 3 Glasses of wine, 3 Standard drinks or equivalent per week     Comment: occasionally    Drug use: No     Review of Systems  See HPI    Physical Exam     Initial Vitals [23]   BP Pulse Resp Temp SpO2   (!) 184/81 79 20 98.2 °F (36.8 °C) 98 %      MAP       --         Physical Exam    Nursing note and vitals reviewed.    Gen: AxOx3, well nourished, appears stated age, no pallor, no jaundice, appears well hydrated  Eye: EOMI, no scleral icterus, no periorbital  edema or ecchymosis  Head: Normocephalic, atraumatic, no lesions, scalp appears normal  ENT: Neck supple, no stridor, no masses, no drooling or voice changes  CVS: All distal pulses intact with normal rate and rhythm, no JVD, normal S1/S2, no murmur  Pulm: Normal breath sounds, no wheezes, rales or rhonchi, no increased work of breathing  Abd:  Nondistended, soft, nontender, no organomegaly, no CVAT  Ext: No edema, no lesions, rashes, or deformity.   Tight muscle spasm paraspinal L3-L5 on left side.   Bilateral straight leg raise test negative.  Decreased sensation of dorsum of left foot and 4th/5th L toes.  Neuro: GCS15, moving all extremities, gait intact, face grossly symmetric  Psych: normal affect, cooperative, well groomed, makes good eye contact      ED Course   Procedures  Labs Reviewed   POCT URINE PREGNANCY          Imaging Results    None          Medications   LIDOcaine 5 % patch 1 patch (1 patch Transdermal Patch Applied 6/28/23 2124)   diazePAM tablet 5 mg (5 mg Oral Given 6/28/23 2124)   ketorolac injection 9.999 mg (9.999 mg Intramuscular Given 6/28/23 2124)     Medical Decision Making:   Initial Assessment:   33-year-old female with chronic back pain presenting with worsening of her back pain and numbness of her left foot and toes. Patient is able to vocalise, breathing spontaneously, hemodynamically stable, oriented, moving all 4 limbs spontaneously.  Examination consistent type muscle spasm over L3-L5 paraspinal area and decreased sensation over dorsum of foot and 4th and 5th toes  Differential Diagnosis:   Radiculopathy secondary to muscle spasm  Doubt cauda equina  Doubt conus medullaris  Doubt fracture  Doubt prevertebral abscess  ED Management:  Given history and physical examination I saw the patient for radiculopathy secondary to muscle spasm.  Decided to apply Lidoderm patch, if Valium, Toradol.  Low suspicion for other emergent conditions based on physical examination including cauda  equina, conus medullaris, fractures, or abscess given lack of red flag signs and symptoms.    Patient discharged with plans take naproxen and follow-up with primary care doctor obtain outpatient MRI as previously planned.                        Clinical Impression:   Final diagnoses:  [S39.012D] Strain of lumbar region, subsequent encounter (Primary)        ED Disposition Condition    Discharge Stable          ED Prescriptions       Medication Sig Dispense Start Date End Date Auth. Provider    naproxen (NAPROSYN) 500 MG tablet Take 1 tablet (500 mg total) by mouth 2 (two) times daily. for 5 days 10 tablet 6/28/2023 7/3/2023 Marylou Dumont MD          Follow-up Information       Follow up With Specialties Details Why Contact Info    Hernan Glasgow MD Internal Medicine Schedule an appointment as soon as possible for a visit   [id number] [facility]  [place] [address] [phone]      [place] - Emergency Dept Emergency Medicine  As needed, If symptoms worsen [address] [facility] [address] [phone]             Marylou Dumont MD  Resident  06/28/23 5199

## 2024-03-20 ENCOUNTER — OFFICE VISIT (OUTPATIENT)
Dept: PRIMARY CARE CLINIC | Facility: CLINIC | Age: 34
End: 2024-03-20
Payer: COMMERCIAL

## 2024-03-20 ENCOUNTER — LAB VISIT (OUTPATIENT)
Dept: LAB | Facility: HOSPITAL | Age: 34
End: 2024-03-20
Attending: NURSE PRACTITIONER
Payer: COMMERCIAL

## 2024-03-20 VITALS
DIASTOLIC BLOOD PRESSURE: 80 MMHG | BODY MASS INDEX: 27.34 KG/M2 | HEIGHT: 62 IN | SYSTOLIC BLOOD PRESSURE: 120 MMHG | WEIGHT: 148.56 LBS | OXYGEN SATURATION: 98 % | HEART RATE: 81 BPM

## 2024-03-20 DIAGNOSIS — G44.86 CERVICOGENIC HEADACHE: ICD-10-CM

## 2024-03-20 DIAGNOSIS — R53.83 FATIGUE, UNSPECIFIED TYPE: ICD-10-CM

## 2024-03-20 DIAGNOSIS — M54.9 DORSALGIA, UNSPECIFIED: Primary | ICD-10-CM

## 2024-03-20 DIAGNOSIS — M54.9 DORSALGIA, UNSPECIFIED: ICD-10-CM

## 2024-03-20 DIAGNOSIS — K21.9 GERD WITHOUT ESOPHAGITIS: ICD-10-CM

## 2024-03-20 LAB
ALBUMIN SERPL BCP-MCNC: 4.2 G/DL (ref 3.5–5.2)
ALP SERPL-CCNC: 49 U/L (ref 55–135)
ALT SERPL W/O P-5'-P-CCNC: 15 U/L (ref 10–44)
ANION GAP SERPL CALC-SCNC: 9 MMOL/L (ref 8–16)
AST SERPL-CCNC: 21 U/L (ref 10–40)
BASOPHILS # BLD AUTO: 0.03 K/UL (ref 0–0.2)
BASOPHILS NFR BLD: 0.4 % (ref 0–1.9)
BILIRUB SERPL-MCNC: 0.3 MG/DL (ref 0.1–1)
BUN SERPL-MCNC: 11 MG/DL (ref 6–20)
CALCIUM SERPL-MCNC: 10.3 MG/DL (ref 8.7–10.5)
CHLORIDE SERPL-SCNC: 106 MMOL/L (ref 95–110)
CO2 SERPL-SCNC: 22 MMOL/L (ref 23–29)
CREAT SERPL-MCNC: 0.9 MG/DL (ref 0.5–1.4)
DIFFERENTIAL METHOD BLD: ABNORMAL
EOSINOPHIL # BLD AUTO: 0.1 K/UL (ref 0–0.5)
EOSINOPHIL NFR BLD: 1.3 % (ref 0–8)
ERYTHROCYTE [DISTWIDTH] IN BLOOD BY AUTOMATED COUNT: 12.4 % (ref 11.5–14.5)
EST. GFR  (NO RACE VARIABLE): >60 ML/MIN/1.73 M^2
GLUCOSE SERPL-MCNC: 90 MG/DL (ref 70–110)
HCG INTACT+B SERPL-ACNC: <2.4 MIU/ML
HCT VFR BLD AUTO: 41.8 % (ref 37–48.5)
HGB BLD-MCNC: 13.2 G/DL (ref 12–16)
IMM GRANULOCYTES # BLD AUTO: 0.01 K/UL (ref 0–0.04)
IMM GRANULOCYTES NFR BLD AUTO: 0.1 % (ref 0–0.5)
LYMPHOCYTES # BLD AUTO: 2.3 K/UL (ref 1–4.8)
LYMPHOCYTES NFR BLD: 33 % (ref 18–48)
MCH RBC QN AUTO: 28.4 PG (ref 27–31)
MCHC RBC AUTO-ENTMCNC: 31.6 G/DL (ref 32–36)
MCV RBC AUTO: 90 FL (ref 82–98)
MONOCYTES # BLD AUTO: 0.5 K/UL (ref 0.3–1)
MONOCYTES NFR BLD: 6.9 % (ref 4–15)
NEUTROPHILS # BLD AUTO: 4 K/UL (ref 1.8–7.7)
NEUTROPHILS NFR BLD: 58.3 % (ref 38–73)
NRBC BLD-RTO: 0 /100 WBC
PLATELET # BLD AUTO: 246 K/UL (ref 150–450)
PMV BLD AUTO: 9.6 FL (ref 9.2–12.9)
POTASSIUM SERPL-SCNC: 4.2 MMOL/L (ref 3.5–5.1)
PROT SERPL-MCNC: 8.2 G/DL (ref 6–8.4)
RBC # BLD AUTO: 4.64 M/UL (ref 4–5.4)
SODIUM SERPL-SCNC: 137 MMOL/L (ref 136–145)
T4 FREE SERPL-MCNC: 0.9 NG/DL (ref 0.71–1.51)
TSH SERPL DL<=0.005 MIU/L-ACNC: 1.47 UIU/ML (ref 0.4–4)
WBC # BLD AUTO: 6.85 K/UL (ref 3.9–12.7)

## 2024-03-20 PROCEDURE — 36415 COLL VENOUS BLD VENIPUNCTURE: CPT | Performed by: NURSE PRACTITIONER

## 2024-03-20 PROCEDURE — 85025 COMPLETE CBC W/AUTO DIFF WBC: CPT | Performed by: NURSE PRACTITIONER

## 2024-03-20 PROCEDURE — 3079F DIAST BP 80-89 MM HG: CPT | Mod: CPTII,S$GLB,, | Performed by: NURSE PRACTITIONER

## 2024-03-20 PROCEDURE — 84443 ASSAY THYROID STIM HORMONE: CPT | Performed by: NURSE PRACTITIONER

## 2024-03-20 PROCEDURE — 99999 PR PBB SHADOW E&M-EST. PATIENT-LVL IV: CPT | Mod: PBBFAC,,, | Performed by: NURSE PRACTITIONER

## 2024-03-20 PROCEDURE — 84439 ASSAY OF FREE THYROXINE: CPT | Performed by: NURSE PRACTITIONER

## 2024-03-20 PROCEDURE — 99214 OFFICE O/P EST MOD 30 MIN: CPT | Mod: S$GLB,,, | Performed by: NURSE PRACTITIONER

## 2024-03-20 PROCEDURE — 84702 CHORIONIC GONADOTROPIN TEST: CPT | Performed by: NURSE PRACTITIONER

## 2024-03-20 PROCEDURE — 80053 COMPREHEN METABOLIC PANEL: CPT | Performed by: NURSE PRACTITIONER

## 2024-03-20 PROCEDURE — 3074F SYST BP LT 130 MM HG: CPT | Mod: CPTII,S$GLB,, | Performed by: NURSE PRACTITIONER

## 2024-03-20 PROCEDURE — 3008F BODY MASS INDEX DOCD: CPT | Mod: CPTII,S$GLB,, | Performed by: NURSE PRACTITIONER

## 2024-03-20 RX ORDER — PANTOPRAZOLE SODIUM 40 MG/1
40 TABLET, DELAYED RELEASE ORAL DAILY
Qty: 30 TABLET | Refills: 1 | Status: SHIPPED | OUTPATIENT
Start: 2024-03-20 | End: 2025-03-20

## 2024-03-20 NOTE — PROGRESS NOTES
[facility] Note    Chief Complaint      Chief Complaint   Patient presents with    Follow-up     Headaches, back/leg pain, heartburn       History of Present Illness      Jd Dwyer is a 33 y.o. female with chronic conditions of gerd, back pain who presents today for: follow up from last April. Still having same complaints. Complaining of persistent headache, more posterior with intermittent neck pain.also describes as frontal... No fever or chills. No changes in vision. Tried flexeril with minimal relief, has taken Excedrin/tylenol/ibuprofen for headaches and no improvement.    Pt also complaining of lower back pain that radiates to left leg, has noticed intermittently left leg has gone numb. Denies unintentional weight loss, bowel/bladder incontinence, or fever. Did not get MRI or set up with pain management yet, had switched insurances last year but interested now to pursue. Had 1 ER visit for leg pain last year was given naproxen without relief. Has also tried PT without improvement.   Pt complaining about severe heartburn > 1 year. Denies vomiting, early satiety. Has heartburn even with water. No longer taking prilosec. Has yet to see GI. No unintentional weight loss. Has mild epigastric discomfort.     Past Medical History:  History reviewed. No pertinent past medical history.    Past Surgical History:   has a past surgical history that includes  section.    Family History:  family history includes Diabetes in her maternal grandmother; No Known Problems in her father and mother.     Social History:  Social History     Tobacco Use    Smoking status: Never    Smokeless tobacco: Never   Substance Use Topics    Alcohol use: Yes     Alcohol/week: 3.0 standard drinks of alcohol     Types: 3 Glasses of wine per week     Comment: occasionally    Drug use: No       Review of Systems   Constitutional:  Positive for malaise/fatigue. Negative for chills and fever.   Eyes:  Negative for  blurred vision and double vision.   Respiratory:  Negative for cough and shortness of breath.    Cardiovascular:  Negative for chest pain and palpitations.   Gastrointestinal:  Positive for heartburn. Negative for blood in stool, constipation, diarrhea, nausea and vomiting.   Genitourinary:  Negative for dysuria and hematuria.   Musculoskeletal:  Positive for back pain (with left leg pain). Negative for falls.   Neurological:  Positive for headaches.        Medications:  Outpatient Encounter Medications as of 3/20/2024   Medication Sig Dispense Refill    EScitalopram oxalate (LEXAPRO) 10 MG tablet Take 1 tablet (10 mg total) by mouth once daily. (Patient not taking: Reported on 3/20/2024) 30 tablet 11    ibuprofen (ADVIL,MOTRIN) 600 MG tablet Take 1 tablet (600 mg total) by mouth every 6 (six) hours as needed. (Patient not taking: Reported on 3/20/2024) 20 tablet 0    pantoprazole (PROTONIX) 40 MG tablet Take 1 tablet (40 mg total) by mouth once daily. 30 tablet 1    tiZANidine (ZANAFLEX) 4 MG tablet Take 1 tablet (4 mg total) by mouth every 6 (six) hours as needed. (Patient not taking: Reported on 3/20/2024) 30 tablet 0    [DISCONTINUED] omeprazole (PRILOSEC) 40 MG capsule Take 1 capsule (40 mg total) by mouth every morning. (Patient not taking: Reported on 3/20/2024) 30 capsule 1     No facility-administered encounter medications on file as of 3/20/2024.       Allergies:  Review of patient's allergies indicates:  No Known Allergies    Health Maintenance:  Immunization History   Administered Date(s) Administered    COVID-19, MRNA, LN-S, PF (Pfizer) (Gray Cap) 03/10/2022    COVID-19, MRNA, LN-S, PF (Pfizer) (Purple Cap) 08/19/2021, 10/07/2021    COVID-19, mRNA, LNP-S, bivalent booster, PF (Moderna Omicron)12 + YEARS 10/21/2022    Rho (D) Immune Globulin - IM 03/17/2016      Health Maintenance   Topic Date Due    TETANUS VACCINE  Never done    Hepatitis C Screening  Completed    Lipid Panel  Completed        Physical  "Exam      Vital Signs  Pulse: 81  SpO2: 98 %  BP: 120/80  BP Location: Right arm  Patient Position: Sitting  Height and Weight  Height: 5' 2" (157.5 cm)  Weight: 67.4 kg (148 lb 9.4 oz)  BSA (Calculated - sq m): 1.72 sq meters  BMI (Calculated): 27.2  Weight in (lb) to have BMI = 25: 136.4]    Physical Exam  Constitutional:       Appearance: She is well-developed.   HENT:      Head: Normocephalic and atraumatic.      Right Ear: Tympanic membrane normal.      Left Ear: Tympanic membrane normal.   Cardiovascular:      Rate and Rhythm: Normal rate and regular rhythm.      Heart sounds: Normal heart sounds. No murmur heard.  Pulmonary:      Effort: Pulmonary effort is normal. No respiratory distress.      Breath sounds: Normal breath sounds.   Abdominal:      General: There is no distension.      Palpations: Abdomen is soft.      Tenderness: There is no abdominal tenderness. There is no guarding.   Musculoskeletal:         General: No tenderness.      Cervical back: Normal range of motion and neck supple. No rigidity or tenderness (ttp to cervicalspinal).      Comments: Positive straight leg test L  -contralateral   Skin:     General: Skin is warm and dry.   Neurological:      General: No focal deficit present.      Mental Status: She is alert. Mental status is at baseline.   Psychiatric:         Mood and Affect: Mood normal.         Behavior: Behavior normal.          Laboratory:  CBC:      CMP:        Invalid input(s): "CREATININ"  URINALYSIS:       LIPIDS:  Recent Labs   Lab 01/19/23  0000   HDL 65   Cholesterol 165   Triglycerides 136   LDL Calculated 72     TSH:      A1C:        Assessment/Plan     Jd Dwyer is a 33 y.o.female with:    1. Dorsalgia, unspecified  - MRI Lumbar Spine Without Contrast; Future  - Ambulatory referral/consult to Pain Clinic; Future  - will send in gabapentin to see if helps with pain    2. GERD without esophagitis  - Ambulatory referral/consult to Gastroenterology; Future  - " pantoprazole (PROTONIX) 40 MG tablet; Take 1 tablet (40 mg total) by mouth once daily.  Dispense: 30 tablet; Refill: 1    3. Cervicogenic headache  - Ambulatory referral/consult to Neurology; Future    4. Fatigue, unspecified type  - HCG, QUANTITATIVE, PREGNANCY; Future   - CBC W/ AUTO DIFFERENTIAL; Future  - COMPREHENSIVE METABOLIC PANEL; Future  - TSH; Future  - T4, FREE; Future    Chronic conditions status updated as per HPI.  Other than changes above, cont current medications and maintain follow up with specialists.  No follow-ups on file.    Future Appointments   Date Time Provider Department Center   4/26/2024  1:45 PM Hernan Glasgow MD [facility] [place] Adreinne Cotaya, FNP [facility]

## 2024-03-26 ENCOUNTER — OFFICE VISIT (OUTPATIENT)
Dept: GASTROENTEROLOGY | Facility: CLINIC | Age: 34
End: 2024-03-26
Payer: COMMERCIAL

## 2024-03-26 VITALS
HEIGHT: 62 IN | SYSTOLIC BLOOD PRESSURE: 117 MMHG | BODY MASS INDEX: 27.14 KG/M2 | HEART RATE: 76 BPM | WEIGHT: 147.5 LBS | DIASTOLIC BLOOD PRESSURE: 74 MMHG

## 2024-03-26 DIAGNOSIS — K21.9 GERD WITHOUT ESOPHAGITIS: Primary | ICD-10-CM

## 2024-03-26 DIAGNOSIS — R10.13 EPIGASTRIC PAIN: ICD-10-CM

## 2024-03-26 PROCEDURE — 3074F SYST BP LT 130 MM HG: CPT | Mod: CPTII,S$GLB,, | Performed by: NURSE PRACTITIONER

## 2024-03-26 PROCEDURE — 3008F BODY MASS INDEX DOCD: CPT | Mod: CPTII,S$GLB,, | Performed by: NURSE PRACTITIONER

## 2024-03-26 PROCEDURE — 3078F DIAST BP <80 MM HG: CPT | Mod: CPTII,S$GLB,, | Performed by: NURSE PRACTITIONER

## 2024-03-26 PROCEDURE — 99999 PR PBB SHADOW E&M-EST. PATIENT-LVL IV: CPT | Mod: PBBFAC,,, | Performed by: NURSE PRACTITIONER

## 2024-03-26 PROCEDURE — 99204 OFFICE O/P NEW MOD 45 MIN: CPT | Mod: S$GLB,,, | Performed by: NURSE PRACTITIONER

## 2024-03-26 PROCEDURE — 1159F MED LIST DOCD IN RCRD: CPT | Mod: CPTII,S$GLB,, | Performed by: NURSE PRACTITIONER

## 2024-03-26 PROCEDURE — 1160F RVW MEDS BY RX/DR IN RCRD: CPT | Mod: CPTII,S$GLB,, | Performed by: NURSE PRACTITIONER

## 2024-03-26 NOTE — PATIENT INSTRUCTIONS
EGD Prep Instructions    [facility] [address] [place]  [address]    You are scheduled for an EGD with Dr. Barnes on 4/4/2024 at [facility].  You will enter through the [facility] entrance and check in at Same Day Surgery.    Nothing to eat or drink after midnight before the procedure.  You MAY brush your teeth.    You MAY take your blood pressure, heart, and seizure medication on the morning of the procedure, with a SIP of water.  Hold ALL other medications until after the procedure.    You must have someone with you to DRIVE YOU HOME since you will be receiving IV sedation for the procedure.    If you are on blood thinners THAT YOU HAVE BEEN INSTRUCTED TO HOLD BY YOUR DOCTOR FOR THIS PROCEDURE, then do NOT take this the morning of your EGD.  Do NOT stop these medications on your own, they must be approved to be held by your doctor.  Your EGD can NOT be done if you are on these medications.  Examples of blood thinners include: Coumadin, Aggrenox, Plavix, Pradaxa, Reapro, Pletal, Xarelto, Ticagrelor, Brilinta, Eliquis, and high dose aspirin (325 mg).  You do not have to stop baby aspirin 81 mg.    You will receive a call the afternoon before your EGD to tell you the time to arrive.  If you have not received a call by the day before your procedure, call the Pre-op Coordinator at [phone].

## 2024-03-26 NOTE — PROGRESS NOTES
Subjective:       Patient ID: Jd Dwyer is a 33 y.o. female.    Chief Complaint: Gastroesophageal Reflux    34 y/o female referred by PCP for GERD and epigastric pain. Previously tried famotidine (Pepcid) daily for at least 3 months without relief then tried OTC Prilosec for 1 month without relief. She was started on prescribed pantoprazole last with with some relief. Hx of chronic NSAID use for headache pain.     Gastroesophageal Reflux  She complains of abdominal pain, heartburn and nausea. She reports no chest pain, no choking, no coughing, no dysphagia, no globus sensation, no sore throat or no wheezing. This is a new problem. The current episode started more than 1 month ago. The problem occurs frequently. The problem has been waxing and waning. The heartburn duration is several minutes. The heartburn is located in the abdomen. The heartburn is of moderate intensity. The heartburn does not wake her from sleep. The heartburn does not limit her activity. The heartburn doesn't change with position. Nothing aggravates the symptoms. Pertinent negatives include no anemia, fatigue, melena or weight loss. Risk factors include NSAIDs. She has tried a PPI and an antacid for the symptoms. The treatment provided mild relief.       History reviewed. No pertinent past medical history.    Past Surgical History:   Procedure Laterality Date     SECTION         Family History   Problem Relation Age of Onset    No Known Problems Mother     No Known Problems Father     Diabetes Maternal Grandmother        Social History     Socioeconomic History    Marital status: Single   Tobacco Use    Smoking status: Never    Smokeless tobacco: Never   Substance and Sexual Activity    Alcohol use: Yes     Alcohol/week: 3.0 standard drinks of alcohol     Types: 3 Glasses of wine per week     Comment: occasionally    Drug use: No    Sexual activity: Yes     Partners: Male     Birth control/protection: I.U.D.       Review of  "Systems   Constitutional:  Negative for fatigue and weight loss.   HENT:  Negative for sore throat.    Respiratory:  Negative for cough, choking and wheezing.    Cardiovascular:  Negative for chest pain.   Gastrointestinal:  Positive for abdominal pain, heartburn and nausea. Negative for blood in stool, constipation, diarrhea, dysphagia and melena.   Psychiatric/Behavioral:  Negative for dysphoric mood.          Objective:     Vitals:    03/26/24 0906   BP: 117/74   BP Location: Left arm   Patient Position: Sitting   BP Method: Medium (Automatic)   Pulse: 76   Weight: 66.9 kg (147 lb 7.8 oz)   Height: 5' 2" (1.575 m)          Physical Exam  Constitutional:       General: She is not in acute distress.     Appearance: Normal appearance.   HENT:      Head: Normocephalic.   Eyes:      Conjunctiva/sclera: Conjunctivae normal.   Pulmonary:      Effort: Pulmonary effort is normal. No respiratory distress.   Musculoskeletal:         General: Normal range of motion.      Cervical back: Normal range of motion.   Skin:     General: Skin is warm and dry.   Neurological:      Mental Status: She is alert and oriented to person, place, and time.   Psychiatric:         Mood and Affect: Mood normal.         Behavior: Behavior normal.               Assessment:         ICD-10-CM ICD-9-CM   1. GERD without esophagitis  K21.9 530.81   2. Epigastric pain  R10.13 789.06       Plan:       GERD without esophagitis; Epigastric pain  -     Continue pantoprazole 40 mg daily  -     Case Request Endoscopy: EGD (ESOPHAGOGASTRODUODENOSCOPY)  - Discussed elimination of dietary triggers (fatty foods, caffeine, chocolate, spicy foods, food with high fat content, carbonated beverages, and peppermint.  - Raise the head of your bed by 6 to 8 inches - You can do this by putting blocks of wood or rubber under 2 legs of the bed or a foam wedge under the mattress.  - Avoid late meals - Lying down with a full stomach can make reflux worse. Try to plan meals " for at least 2 to 3 hours before bedtime.  - Avoid tight clothing - Some people feel better if they wear comfortable clothing that does not squeeze the stomach area.       Follow up if symptoms worsen or fail to improve.     Patient's Medications   New Prescriptions    No medications on file   Previous Medications    ESCITALOPRAM OXALATE (LEXAPRO) 10 MG TABLET    Take 1 tablet (10 mg total) by mouth once daily.    IBUPROFEN (ADVIL,MOTRIN) 600 MG TABLET    Take 1 tablet (600 mg total) by mouth every 6 (six) hours as needed.    PANTOPRAZOLE (PROTONIX) 40 MG TABLET    Take 1 tablet (40 mg total) by mouth once daily.    TIZANIDINE (ZANAFLEX) 4 MG TABLET    Take 1 tablet (4 mg total) by mouth every 6 (six) hours as needed.   Modified Medications    No medications on file   Discontinued Medications    No medications on file

## 2024-03-28 RX ORDER — GABAPENTIN 300 MG/1
300 CAPSULE ORAL NIGHTLY
Qty: 30 CAPSULE | Refills: 0 | Status: SHIPPED | OUTPATIENT
Start: 2024-03-28 | End: 2024-04-02

## 2024-04-01 ENCOUNTER — TELEPHONE (OUTPATIENT)
Dept: PRIMARY CARE CLINIC | Facility: CLINIC | Age: 34
End: 2024-04-01
Payer: COMMERCIAL

## 2024-04-01 NOTE — TELEPHONE ENCOUNTER
----- Message from Rosio Zavaleta sent at 4/1/2024  1:00 PM CDT -----  Contact: [employer] [phone]  Would like to receive medical advice.    Would they like a call back or a response via [facility]:  call back    Additional information:  Bharath from [employer] is calling to request if the provider or staff can please get in touch with [facility] for the prior authorization of the pt's  MRI Lumbar spine. Please call [facility] for advice      Case Number    [id number]

## 2024-04-15 ENCOUNTER — PATIENT MESSAGE (OUTPATIENT)
Dept: GASTROENTEROLOGY | Facility: CLINIC | Age: 34
End: 2024-04-15
Payer: COMMERCIAL

## 2024-05-15 ENCOUNTER — OFFICE VISIT (OUTPATIENT)
Dept: PAIN MEDICINE | Facility: CLINIC | Age: 34
End: 2024-05-15
Payer: COMMERCIAL

## 2024-05-15 DIAGNOSIS — G89.29 CHRONIC LEFT-SIDED LOW BACK PAIN WITH LEFT-SIDED SCIATICA: ICD-10-CM

## 2024-05-15 DIAGNOSIS — M54.9 DORSALGIA, UNSPECIFIED: ICD-10-CM

## 2024-05-15 DIAGNOSIS — M54.42 CHRONIC LEFT-SIDED LOW BACK PAIN WITH LEFT-SIDED SCIATICA: ICD-10-CM

## 2024-05-15 DIAGNOSIS — M54.16 LUMBAR RADICULOPATHY, CHRONIC: Primary | ICD-10-CM

## 2024-05-15 PROCEDURE — 1160F RVW MEDS BY RX/DR IN RCRD: CPT | Mod: CPTII,S$GLB,, | Performed by: ANESTHESIOLOGY

## 2024-05-15 PROCEDURE — 1159F MED LIST DOCD IN RCRD: CPT | Mod: CPTII,S$GLB,, | Performed by: ANESTHESIOLOGY

## 2024-05-15 PROCEDURE — 99204 OFFICE O/P NEW MOD 45 MIN: CPT | Mod: S$GLB,,, | Performed by: ANESTHESIOLOGY

## 2024-05-15 PROCEDURE — 99999 PR PBB SHADOW E&M-EST. PATIENT-LVL III: CPT | Mod: PBBFAC,,, | Performed by: ANESTHESIOLOGY

## 2024-05-15 RX ORDER — PREGABALIN 100 MG/1
100 CAPSULE ORAL 3 TIMES DAILY
Qty: 90 CAPSULE | Refills: 0 | Status: SHIPPED | OUTPATIENT
Start: 2024-05-15

## 2024-05-15 NOTE — PROGRESS NOTES
[facility] New Patient Evaluation    Jd Dwyer  : 1990  Date: 5/15/2024     CHIEF COMPLAINT:  No chief complaint on file.    Referring Physician: Alessandra Etienne NP  Primary Care Physician: Hernan Glasgow MD    HPI:  This is a 33 y.o. female with a chief complaint of No chief complaint on file.  . The patient has Past medical history/Past surgical history of acid reflux, cervicogenic headache , chronic low back pain    Patient was evaluated and referred by primary care provider for low back pain  She has completed MRI lumbar spine however it was not released  Previously was on gabapentin  Last x-ray was in  showed lumbarization of S1 (!)  Diabetic: {GAYes/No/NA:84395}    {Anticogualtion drugs:84685}    Allergy To Iodine: {GAYes/No/NA:93074}    Currently on Antibiotic: {GAYes/No/NA:49156}    Current Description of Pain Symptoms:    History of Recent Fall or Trauma: {GAYes/No/NA:01838}   Onset: Chronic, started ***  Pain Location: ***  Radiates/associated symptoms: ***.   Pain is Getting worse over the last *** months    The pain is described as {Desc; pain character:30279}.   Exacerbating factors: {Causes; Pain:11943}.   Mitigating factors ***.   Symptoms interfere with daily activity, sleeping, and ***.   The patient feels like symptoms have been {IUW:28477}.   Patient {Denies / Reports:92139} {RED FLAGS:98782}.    Pain score:   Current: {PAIN 0-10:22274}/10  Best: {PAIN 0-10:05713}/10  Worst: {PAIN 0-10:26886}/10    Current pain medications:    ***  Current Narcotics/Opioid /benzo Medications:  Opioids- None  Benzodiazepines: No    UDS:  NA    PDMP:  {:90386}     Previous Chronic Pain Treatment History:  Physical Therapy/HEP/Physician Lead Exercise Program:  Over the past 12 months, Patient has done  *** sessions.  PT response: {PT response:91946} Helpful.   Dates of the PT sessions: ***, ***  Is patient participating in home exercise program (HEP):  "{GAYes/No/NA:79272}.    Non-interventional Pain Therapy:  []Chiropractor.   []Acupuncture/Dry needle.  []TENS unit.  []Heat/ICE.  []Back Brace.    Medications previously tried:  NSAIDs: {GANSIAD:56261}  Topical Agent: {GAYes/No/NA:31422}  TCA/SSRI/SNRI: {GATCA/SSRI/SNRI:67661}  Anti-convulsants: {GAAnticonvulsants:37291}  Muscle Relaxants: {GAmuscle Relaxant:04711}  Opioids- {GAopioid:11401}.    Interventional Pain Procedures:  ***    Previous spine/Relevant joint surgery:  ***  Surgical History:   has a past surgical history that includes  section and Esophagogastroduodenoscopy (N/A, 2024).  Medical History:   has a past medical history of Back pain and Heartburn.  Family History:  family history includes Diabetes in her maternal grandmother; No Known Problems in her father and mother.  Allergies:  Patient has no known allergies.   Social History/SUBSTANCE ABUSE HISTORY:  Personal history of substance abuse: No   reports that she has never smoked. She has never used smokeless tobacco. She reports current alcohol use of about 3.0 standard drinks of alcohol per week. She reports that she does not use drugs.  LABS:  CBC  Lab Results   Component Value Date    WBC 6.85 2024    HGB 13.2 2024    HCT 41.8 2024     Coagulation Profile   Lab Results   Component Value Date     2024     No results found for: "PT", "PTT", "INR"  CMP:  BMP  Lab Results   Component Value Date     2024    K 4.2 2024     2024    CO2 22 (L) 2024    BUN 11 2024    CREATININE 0.9 2024    CALCIUM 10.3 2024    ANIONGAP 9 2024    EGFRNORACEVR >60.0 2024     Lab Results   Component Value Date    ALT 15 2024    AST 21 2024    ALKPHOS 49 (L) 2024    BILITOT 0.3 2024     HGBA1C:  No results found for: "LABA1C", "HGBA1C"    ROS:    Review of Systems   GENERAL:  No weight loss, malaise or fevers.  HEENT:   No recent changes in " vision or hearing  NECK:  Negative for lumps, no difficulty with swallowing.  RESPIRATORY:  Negative for cough, wheezing or shortness of breath, patient denies any recent URI.  CARDIOVASCULAR:  Negative for chest pain, leg swelling or palpitations.  GI:  Negative for abdominal discomfort, blood in stools or black stools or change in bowel habits.  MUSCULOSKELETAL:  See HPI.  SKIN:  Negative for lesions, rash, and itching.  PSYCH:  No mood disorder or recent psychosocial stressors.   HEMATOLOGY/LYMPHOLOGY:  See the blood thinner sectioned in HPI.  NEURO:  See HPI  All other reviewed and negative other than HPI.    PHYSICAL EXAM:  VITALS: There were no vitals taken for this visit.  There is no height or weight on file to calculate BMI.  GENERAL: Well appearing, in no acute distress, alert and oriented x3, answers questions appropriately.   PSYCH: Flat affect.  SKIN: Skin color, texture, turgor normal, no rashes or lesions.  HEAD/FACE:  Normocephalic, atraumatic. Cranial nerves grossly intact.  CV: Regular rate  PULM: No evidence of respiratory difficulty, symmetric chest rise.  GI:  Soft and non-Distended.  BACK/SIJ/HIP:  Lumbar Spine Exam:       Inspection: No erythema, bruising.       Palpation: (***) TTP of lumbar paraspinals bilaterally      ROM:  Limited in flexion, extension, lateral bending.       (***) Facet loading bilaterally      (***) Straight Leg Raise bilaterally      (***) ANTONIA bilaterally, Tenderness over the PSIS, Yeoman test  Hip Exam:      Inspection: No gross deformity or apparent leg length discrepancy      Palpation:  No TTP to bilateral greater trochanteric bursas.       ROM:  No limitation or pain in internal rotation, external rotation b/l  Neurologic Exam:     Alert. Speech is fluent and appropriate.     Strength: ***/5 in *** hip flexion and knee extension, otherwise 5/5 throughout bilateral lower extremities     Sensation:  Grossly intact to light touch in bilateral lower extremities      "Tone: No abnormality appreciated in bilateral lower extremities  GAIT: ***    DIAGNOSTIC STUDIES AND MEDICAL RECORDS REVIEW:  ***  ASSESSMENT:  Jd Dwyer is a 33 y.o. female with the following diagnoses based on history, exam, and imaging:  There are no diagnoses linked to this encounter.   ---------------------------------------------------------------------  This is a pleasant 33 y.o. female patient with PMH ***, presenting with***.     We discussed the underlying diagnoses and multiple treatment options including non-opioid medications, interventional procedures, physical therapy, home exercise, core muscle enhancement, and weight loss.  The risks and benefits of each treatment option were discussed and all questions were answered.      Treatment Plan:    Diagnostics/Referrals: *** .    Medications:    NSAIDs: {GANSIAD:18285}  Topical Agent: {GAYes/No/NA:98391}  TCA/SSRI/SNRI: {GATCA/SSRI/SNRI:65468}  Anti-convulsants: {GAAnticonvulsants:73591}  Muscle Relaxants: {GAmuscle Relaxant:49314}  Opioids: {GAopioid:97361::"None"}    Interventional Therapy: Please schedule for {Procedure:56532}.  Sedation: ***  Clearance to stop Blood thinner: {Anticogualtion drugs:55162}    Regarding the above interventions, the patient has been educated regarding the risks (including bleeding, infection, increased pain, nerve damage, or allergic reaction), benefits, and alternatives. The patient states she understands and is eager to proceed.    Physical Rehabilitation: {blhtherapy:61282}.    Patient Education: Counseled patient regarding the importance of {:41433}, I have stressed the importance of physical activity and a home exercise plan to help with pain and improve health.    Follow-up: RTC ***.    May consider:     I would like to thank Alessandra Etienne, MAXIMILIAN for the opportunity to assist in the care of this patient. We had a very nice visit and I look forward to continuing their care. Please let me know if I can " be of further assistance.     Arvind Fagan MD  Anesthesiologist  Interventional Pain Medicine  05/15/2024    Disclaimer:  This note was prepared using voice recognition system and is likely to have sound alike errors that may have been overlooked even after proof reading.  Please call me with any questions.

## 2024-05-15 NOTE — PROGRESS NOTES
[facility] New Patient Evaluation    Jd Dwyer  : 1990  Date: 5/15/2024     CHIEF COMPLAINT:  Back Pain, Neck Pain, and Leg Pain    Referring Physician: Alessandra Etienne NP  Primary Care Physician: Hernan Glasgow MD    HPI:  This is a 33 y.o. female with a chief complaint of Back Pain, Neck Pain, and Leg Pain  . The patient has Past medical history/Past surgical history of acid reflux, cervicogenic headache , chronic low back pain    Patient was evaluated and referred by primary care provider for low back pain  She has not completed MRI lumbar spine due to insurance approval  She is currently on gabapentin 800 mg 3 times a day, was not effective  Last x-ray was in  showed lumbarization of S1(!)  Versus 6 lumbar vertebrae  Diabetic: No    Anticogualtion drugs: None    Allergy To Iodine: No    Currently on Antibiotic: No    Current Description of Pain Symptoms:    History of Recent Fall or Trauma: No   Onset: Chronic, started several years  Pain Location:  Left-sided low back pain  Radiates/associated symptoms: Left LE, lateral aspect below the knee to the left foot, no lower extremity symptoms  Pain is Getting worse over the last 3 months    The pain is described as constant sting (thigh, calf), tingling in foot/throbbing. Stabbing in neck, constant pain in back.   Exacerbating factors: neck-worse with laying, back- constant, leg-laying  Mitigating factors none.   Symptoms interfere with daily activity, sleeping, and work.   The patient feels like symptoms have been worsening.   Patient denies night fever/night sweats, urinary incontinence, bowel incontinence, significant weight loss, significant motor weakness, and loss of sensations.    Pain score:   Current: 9/10  Best: 8/10  Worst: 10/10    Current pain medications:  Gabapentin 800 mg TID-not effective    Current Narcotics/Opioid /benzo Medications:  Opioids- None  Benzodiazepines: No    UDS:  NA    PDMP:  Reviewed and  "consistent with medication use as prescribed.     Previous Chronic Pain Treatment History:  Physical Therapy/HEP/Physician Lead Exercise Program:  Over the past 12 months, Patient has done 0 sessions.  PT response: n/a  Dates of the PT sessions: 2022-did not help  Is patient participating in home exercise program (HEP): Yes more than 6 weeks over the last 6 months.    Non-interventional Pain Therapy:  []Chiropractor.   []Acupuncture/Dry needle.  []TENS unit.  [x]Heat/ICE.  []Back Brace.    Medications previously tried:  NSAIDs: Ibuprofen (Advil/Motrin), Meloxicam (Mobic), and Naproxen (Naprosyn)  Topical Agent: Yes-lidocaine patches   TCA/SSRI/SNRI: None  Anti-convulsants: Gabapentin   Muscle Relaxants: Flexeril (Cyclobenzaprine) and Tizanidine (Zanaflex)  Opioids- None.    Interventional Pain Procedures:  None     Previous spine/Relevant joint surgery:  None  Surgical History:   has a past surgical history that includes  section and Esophagogastroduodenoscopy (N/A, 2024).  Medical History:   has a past medical history of Back pain and Heartburn.  Family History:  family history includes Diabetes in her maternal grandmother; No Known Problems in her father and mother.  Allergies:  Patient has no known allergies.   Social History/SUBSTANCE ABUSE HISTORY:  Personal history of substance abuse: No   reports that she has never smoked. She has never used smokeless tobacco. She reports current alcohol use of about 3.0 standard drinks of alcohol per week. She reports that she does not use drugs.  LABS:  CBC  Lab Results   Component Value Date    WBC 6.85 2024    HGB 13.2 2024    HCT 41.8 2024     Coagulation Profile   Lab Results   Component Value Date     2024     No results found for: "PT", "PTT", "INR"  CMP:  BMP  Lab Results   Component Value Date     2024    K 4.2 2024     2024    CO2 22 (L) 2024    BUN 11 2024    CREATININE 0.9 " "03/20/2024    CALCIUM 10.3 03/20/2024    ANIONGAP 9 03/20/2024    EGFRNORACEVR >60.0 03/20/2024     Lab Results   Component Value Date    ALT 15 03/20/2024    AST 21 03/20/2024    ALKPHOS 49 (L) 03/20/2024    BILITOT 0.3 03/20/2024     HGBA1C:  No results found for: "LABA1C", "HGBA1C"    ROS:    Review of Systems   GENERAL:  No weight loss, malaise or fevers.  HEENT:   No recent changes in vision or hearing  NECK:  Negative for lumps, no difficulty with swallowing.  RESPIRATORY:  Negative for cough, wheezing or shortness of breath, patient denies any recent URI.  CARDIOVASCULAR:  Negative for chest pain, leg swelling or palpitations.  GI:  Negative for abdominal discomfort, blood in stools or black stools or change in bowel habits.  MUSCULOSKELETAL:  See HPI.  SKIN:  Negative for lesions, rash, and itching.  PSYCH:  No mood disorder or recent psychosocial stressors.   HEMATOLOGY/LYMPHOLOGY:  See the blood thinner sectioned in HPI.  NEURO:  See HPI  All other reviewed and negative other than HPI.    PHYSICAL EXAM:  VITALS: There were no vitals taken for this visit.  There is no height or weight on file to calculate BMI.  GENERAL: Well appearing, in no acute distress, alert and oriented x3, answers questions appropriately.   PSYCH: Flat affect.  SKIN: Skin color, texture, turgor normal, no rashes or lesions.  HEAD/FACE:  Normocephalic, atraumatic. Cranial nerves grossly intact.  CV: Regular rate  PULM: No evidence of respiratory difficulty, symmetric chest rise.  GI:  Soft and non-Distended.  BACK/SIJ/HIP:  Lumbar Spine Exam:       Inspection: No erythema, bruising.       Palpation: (+) TTP of lumbar paraspinals bilaterally      ROM:  Limited in flexion, extension, lateral bending.       (+) Facet loading bilaterally      (+) Straight Leg Raise left      (+) ANTONIA Tenderness over the PSIS, Yeoman test-left-sided  Hip Exam:      Inspection: No gross deformity or apparent leg length discrepancy      Palpation:  No TTP " to bilateral greater trochanteric bursas.       ROM:  No limitation or pain in internal rotation, external rotation b/l  Neurologic Exam:     Alert. Speech is fluent and appropriate.     Strength: 5/5 in 5 hip flexion and knee extension, otherwise 5/5 throughout bilateral lower extremities     Sensation:  Grossly intact to light touch in bilateral lower extremities     Tone: No abnormality appreciated in bilateral lower extremities  GAIT:  Normal gait    DIAGNOSTIC STUDIES AND MEDICAL RECORDS REVIEW:  X-ray lumbar and cervical spine in  and  reviewed    ASSESSMENT:  Jd Dwyer is a 33 y.o. female with the following diagnoses based on history, exam, and imagin. Lumbar radiculopathy, chronic  - Ambulatory referral/consult to Pain Clinic  - X-Ray Lumbar Complete Including Flex And Ext; Future  - MRI Lumbar Spine Without Contrast; Future  - pregabalin (LYRICA) 100 MG capsule; Take 1 capsule (100 mg total) by mouth 3 (three) times daily.  Dispense: 90 capsule; Refill: 0    2. Dorsalgia, unspecified    3. Chronic left-sided low back pain with left-sided sciatica     ---------------------------------------------------------------------  This is a pleasant 33 y.o. female patient with PMH acid reflux, presenting with chronic worsening low back pain and left lower extremity radiculitis below-the-knee to the left foot with no right lower extremity symptoms she has tried physical therapy in  without relief, she has completed more than 6 weeks of home exercises over the last 6 months without significant improvement in pain and functionality, she has no relief from multiple multimodal pain medication including ibuprofen, Flexeril and gabapentin 800 mg 3 times a day.  Given her radiation symptoms I recommended obtaining MRI lumbar spine and x-ray lumbar spine and follow-up in 3 weeks discussing the MRI lumbar spine    Treatment Plan:    Diagnostics/Referrals:  MRI lumbar spine  .    Medications:    NSAIDs:  Over-the-counter  Topical Agent: No  TCA/SSRI/SNRI: None  Anti-convulsants:  Switching gabapentin to Lyrica 100 mg 3 times a day titration plan, prescription was provided for 30 days  Muscle Relaxants: None  Opioids: None    Interventional Therapy: Please schedule for Procedure based on MRI images after discussing in office.  Sedation: NA  Clearance to stop Blood thinner: Anticogualtion drugs: None    Regarding the above interventions, the patient has been educated regarding the risks (including bleeding, infection, increased pain, nerve damage, or allergic reaction), benefits, and alternatives. The patient states she understands and is eager to proceed.    Physical Rehabilitation:  Continue home exercise and physician led exercise program.    Patient Education: Counseled patient regarding the importance of activity modification and physical therapy, I have stressed the importance of physical activity and a home exercise plan to help with pain and improve health.    Follow-up: RTC 3-4 weeks to discuss MRI lumbar spine.    May consider:  She is going to see Neurology for neck pain and left-sided shoulder pain in addition to the headache, may consider left L4 versus left L5 transforaminal epidural steroid injection based on her symptoms, left SI joint injection.    I would like to thank Alessandra Etienne, MAXIMILIAN for the opportunity to assist in the care of this patient. We had a very nice visit and I look forward to continuing their care. Please let me know if I can be of further assistance.     Arvind Fagan MD  Anesthesiologist  Interventional Pain Medicine  05/15/2024    Disclaimer:  This note was prepared using voice recognition system and is likely to have sound alike errors that may have been overlooked even after proof reading.  Please call me with any questions.

## 2025-05-14 ENCOUNTER — OFFICE VISIT (OUTPATIENT)
Dept: PRIMARY CARE CLINIC | Facility: CLINIC | Age: 35
End: 2025-05-14
Payer: COMMERCIAL

## 2025-05-14 ENCOUNTER — LAB VISIT (OUTPATIENT)
Dept: LAB | Facility: HOSPITAL | Age: 35
End: 2025-05-14
Attending: NURSE PRACTITIONER
Payer: COMMERCIAL

## 2025-05-14 VITALS
SYSTOLIC BLOOD PRESSURE: 110 MMHG | DIASTOLIC BLOOD PRESSURE: 70 MMHG | OXYGEN SATURATION: 98 % | HEART RATE: 82 BPM | BODY MASS INDEX: 26.61 KG/M2 | WEIGHT: 144.63 LBS | HEIGHT: 62 IN

## 2025-05-14 DIAGNOSIS — R51.9 NONINTRACTABLE HEADACHE, UNSPECIFIED CHRONICITY PATTERN, UNSPECIFIED HEADACHE TYPE: ICD-10-CM

## 2025-05-14 DIAGNOSIS — Z00.00 ANNUAL PHYSICAL EXAM: Primary | ICD-10-CM

## 2025-05-14 DIAGNOSIS — Z00.00 ANNUAL PHYSICAL EXAM: ICD-10-CM

## 2025-05-14 DIAGNOSIS — M54.16 LUMBAR RADICULOPATHY, CHRONIC: ICD-10-CM

## 2025-05-14 DIAGNOSIS — K21.9 GERD WITHOUT ESOPHAGITIS: ICD-10-CM

## 2025-05-14 LAB
ABSOLUTE EOSINOPHIL (OHS): 0.11 K/UL
ABSOLUTE MONOCYTE (OHS): 0.44 K/UL (ref 0.3–1)
ABSOLUTE NEUTROPHIL COUNT (OHS): 4 K/UL (ref 1.8–7.7)
ALBUMIN SERPL BCP-MCNC: 4 G/DL (ref 3.5–5.2)
ALP SERPL-CCNC: 55 UNIT/L (ref 40–150)
ALT SERPL W/O P-5'-P-CCNC: 12 UNIT/L (ref 10–44)
ANION GAP (OHS): 12 MMOL/L (ref 8–16)
AST SERPL-CCNC: 19 UNIT/L (ref 11–45)
BASOPHILS # BLD AUTO: 0.04 K/UL
BASOPHILS NFR BLD AUTO: 0.6 %
BILIRUB SERPL-MCNC: 0.3 MG/DL (ref 0.1–1)
BUN SERPL-MCNC: 13 MG/DL (ref 6–20)
CALCIUM SERPL-MCNC: 10.1 MG/DL (ref 8.7–10.5)
CHLORIDE SERPL-SCNC: 107 MMOL/L (ref 95–110)
CHOLEST SERPL-MCNC: 156 MG/DL (ref 120–199)
CHOLEST/HDLC SERPL: 2.4 {RATIO} (ref 2–5)
CO2 SERPL-SCNC: 22 MMOL/L (ref 23–29)
CREAT SERPL-MCNC: 0.8 MG/DL (ref 0.5–1.4)
EAG (OHS): 111 MG/DL (ref 68–131)
ERYTHROCYTE [DISTWIDTH] IN BLOOD BY AUTOMATED COUNT: 12.5 % (ref 11.5–14.5)
GFR SERPLBLD CREATININE-BSD FMLA CKD-EPI: >60 ML/MIN/1.73/M2
GLUCOSE SERPL-MCNC: 86 MG/DL (ref 70–110)
HBA1C MFR BLD: 5.5 % (ref 4–5.6)
HCT VFR BLD AUTO: 41.5 % (ref 37–48.5)
HDLC SERPL-MCNC: 66 MG/DL (ref 40–75)
HDLC SERPL: 42.3 % (ref 20–50)
HGB BLD-MCNC: 12.4 GM/DL (ref 12–16)
IMM GRANULOCYTES # BLD AUTO: 0.01 K/UL (ref 0–0.04)
IMM GRANULOCYTES NFR BLD AUTO: 0.2 % (ref 0–0.5)
LDLC SERPL CALC-MCNC: 78.2 MG/DL (ref 63–159)
LYMPHOCYTES # BLD AUTO: 1.94 K/UL (ref 1–4.8)
MCH RBC QN AUTO: 27.4 PG (ref 27–31)
MCHC RBC AUTO-ENTMCNC: 29.9 G/DL (ref 32–36)
MCV RBC AUTO: 92 FL (ref 82–98)
NONHDLC SERPL-MCNC: 90 MG/DL
NUCLEATED RBC (/100WBC) (OHS): 0 /100 WBC
PLATELET # BLD AUTO: 256 K/UL (ref 150–450)
PMV BLD AUTO: 9.6 FL (ref 9.2–12.9)
POTASSIUM SERPL-SCNC: 4.5 MMOL/L (ref 3.5–5.1)
PROT SERPL-MCNC: 7.8 GM/DL (ref 6–8.4)
RBC # BLD AUTO: 4.52 M/UL (ref 4–5.4)
RELATIVE EOSINOPHIL (OHS): 1.7 %
RELATIVE LYMPHOCYTE (OHS): 29.7 % (ref 18–48)
RELATIVE MONOCYTE (OHS): 6.7 % (ref 4–15)
RELATIVE NEUTROPHIL (OHS): 61.1 % (ref 38–73)
SODIUM SERPL-SCNC: 141 MMOL/L (ref 136–145)
TRIGL SERPL-MCNC: 59 MG/DL (ref 30–150)
TSH SERPL-ACNC: 1.02 UIU/ML (ref 0.4–4)
WBC # BLD AUTO: 6.54 K/UL (ref 3.9–12.7)

## 2025-05-14 PROCEDURE — 85025 COMPLETE CBC W/AUTO DIFF WBC: CPT

## 2025-05-14 PROCEDURE — 3008F BODY MASS INDEX DOCD: CPT | Mod: CPTII,S$GLB,, | Performed by: NURSE PRACTITIONER

## 2025-05-14 PROCEDURE — 80053 COMPREHEN METABOLIC PANEL: CPT

## 2025-05-14 PROCEDURE — 80061 LIPID PANEL: CPT

## 2025-05-14 PROCEDURE — 99395 PREV VISIT EST AGE 18-39: CPT | Mod: S$GLB,,, | Performed by: NURSE PRACTITIONER

## 2025-05-14 PROCEDURE — 99999 PR PBB SHADOW E&M-EST. PATIENT-LVL III: CPT | Mod: PBBFAC,,, | Performed by: NURSE PRACTITIONER

## 2025-05-14 PROCEDURE — 84443 ASSAY THYROID STIM HORMONE: CPT

## 2025-05-14 PROCEDURE — 3078F DIAST BP <80 MM HG: CPT | Mod: CPTII,S$GLB,, | Performed by: NURSE PRACTITIONER

## 2025-05-14 PROCEDURE — 83036 HEMOGLOBIN GLYCOSYLATED A1C: CPT

## 2025-05-14 PROCEDURE — 3074F SYST BP LT 130 MM HG: CPT | Mod: CPTII,S$GLB,, | Performed by: NURSE PRACTITIONER

## 2025-05-14 PROCEDURE — 1159F MED LIST DOCD IN RCRD: CPT | Mod: CPTII,S$GLB,, | Performed by: NURSE PRACTITIONER

## 2025-05-14 PROCEDURE — 36415 COLL VENOUS BLD VENIPUNCTURE: CPT

## 2025-05-14 RX ORDER — FAMOTIDINE 20 MG/1
20 TABLET, FILM COATED ORAL 2 TIMES DAILY
Qty: 60 TABLET | Refills: 1 | Status: SHIPPED | OUTPATIENT
Start: 2025-05-14 | End: 2026-05-14

## 2025-05-14 NOTE — PROGRESS NOTES
[facility] Note    Chief Complaint      Chief Complaint   Patient presents with    Annual Exam       History of Present Illness      Jd Dwyer is a 34 y.o. female with chronic conditions of dorsalgia, gerd who presents today for: annual preventative exam. Generally feels well.   Diet:cooks mostly at home. Limiting fast food. Drinking plenty of water.   Exercise:  rides bike 1x every couple of weeks.   Nonsmoker. Does not drink and drive. Wears seatbelt. No more than 4 drinks on occasion.    Lumbar radiculopathy: saw Dr. Fagan, has not gotten the MRI yet, ready to do so with new insurance. Will call to see if can schedule, if not we can re-order. Still complaining of lower back pain radiating to bilateral leg.   GERD: Since last time had an EGD, unremarkable. Still having with heart burn. Takes pepcid on occasion.   Headaches: persistent headache, more frontal than back this time. Was previously going into neck. Has tried Excedrin/tylenol/ibuprofen without relief. Has not follow up with Neurologist.     Pap smear UTD with Dr. Tapia, going to see GYN in  of this year, changing out IUD.   Cscope due at age 43. Plans to do mammogram with GYN.  Tdap in 2016 due next year.     Past Medical History:  Past Medical History:   Diagnosis Date    Back pain     and down left leg    Headache     Heartburn        Past Surgical History:   has a past surgical history that includes  section and Esophagogastroduodenoscopy (N/A, 2024).    Family History:  family history includes Diabetes in her maternal grandmother; No Known Problems in her father and mother.     Social History:  Social History[1]    Review of Systems   Constitutional:  Negative for chills and fever.   Respiratory:  Negative for cough and shortness of breath.    Cardiovascular:  Negative for chest pain and palpitations.   Gastrointestinal:  Positive for heartburn. Negative for constipation, diarrhea, nausea and vomiting.  "  Genitourinary:  Negative for dysuria and hematuria.   Musculoskeletal:  Positive for back pain. Negative for falls.   Neurological:  Positive for headaches.        Medications:  Encounter Medications[2]    Allergies:  Review of patient's allergies indicates:  No Known Allergies    Health Maintenance:  Immunization History   Administered Date(s) Administered    COVID-19, MRNA, LN-S, PF (Pfizer) (Gray Cap) 03/10/2022    COVID-19, MRNA, LN-S, PF (Pfizer) (Purple Cap) 08/19/2021, 10/07/2021    COVID-19, mRNA, LNP-S, bivalent booster, PF (Moderna Omicron)12 + YEARS 10/21/2022    Rho (D) Immune Globulin - IM 03/17/2016      Health Maintenance   Topic Date Due    TETANUS VACCINE  Never done    Hemoglobin A1c (Diabetic Prevention Screening)  Never done    COVID-19 Vaccine (5 - 2024-25 season) 09/01/2024    Influenza Vaccine (Season Ended) 09/01/2025    Cervical Cancer Screening  06/24/2027    RSV Vaccine (Age 60+ and Pregnant patients) (1 - 1-dose 75+ series) 06/09/2063    Hepatitis C Screening  Completed    HIV Screening  Completed    Lipid Panel  Completed    Pneumococcal Vaccines (Age 0-49)  Aged Out        Physical Exam      Vital Signs  Pulse: 82  SpO2: 98 %  BP: 110/70  BP Location: Left arm  Patient Position: Sitting  Height and Weight  Height: 5' 2" (157.5 cm)  Weight: 65.6 kg (144 lb 10 oz)  BSA (Calculated - sq m): 1.69 sq meters  BMI (Calculated): 26.4  Weight in (lb) to have BMI = 25: 136.4]    Physical Exam  Constitutional:       Appearance: She is well-developed.   HENT:      Head: Normocephalic and atraumatic.   Cardiovascular:      Rate and Rhythm: Normal rate and regular rhythm.      Heart sounds: Normal heart sounds. No murmur heard.  Pulmonary:      Effort: Pulmonary effort is normal. No respiratory distress.      Breath sounds: Normal breath sounds.   Abdominal:      General: There is no distension.      Palpations: Abdomen is soft.      Tenderness: There is no abdominal tenderness. There is no " guarding.   Skin:     General: Skin is warm and dry.   Neurological:      Mental Status: She is alert. Mental status is at baseline.   Psychiatric:         Behavior: Behavior normal.          Laboratory:  CBC:  Recent Labs   Lab 03/20/24  1213   WBC 6.85   RBC 4.64   Hemoglobin 13.2   Hematocrit 41.8   Platelets 246   MCV 90   MCH 28.4   MCHC 31.6 L     CMP:  Recent Labs   Lab 03/20/24  1213   Glucose 90   Calcium 10.3   Albumin 4.2   Total Protein 8.2   Sodium 137   Potassium 4.2   CO2 22 L   Chloride 106   BUN 11   Alkaline Phosphatase 49 L   ALT 15   AST 21   Total Bilirubin 0.3     URINALYSIS:       LIPIDS:  Recent Labs   Lab 01/19/23  0000 03/20/24  1213   TSH  --  1.465   HDL 65  --    Cholesterol 165  --    Triglycerides 136  --    LDL Calculated 72  --      TSH:  Recent Labs   Lab 03/20/24  1213   TSH 1.465     A1C:        Assessment/Plan     Jd Dwyer is a 34 y.o.female with:    1. Annual physical exam  - CBC Auto Differential; Future  - Comprehensive Metabolic Panel; Future  - Hemoglobin A1C; Future  - TSH; Future  - Lipid Panel; Future  - counseled on diet and exercise  - counseled on preventative screening, cancer screening, and immunizations     2. Lumbar radiculopathy, chronic  Will follow up with pain management for MRI     3. GERD without esophagitis  - famotidine (PEPCID) 20 MG tablet; Take 1 tablet (20 mg total) by mouth 2 (two) times daily.  Dispense: 60 tablet; Refill: 1  --   Discussed elimination of dietary triggers (fatty foods, caffeine, chocolate, spicy foods, food with high fat content, carbonated beverages, and peppermint.  - Raise the head of your bed by 6 to 8 inches - You can do this by putting blocks of wood or rubber under 2 legs of the bed or a foam wedge under the mattress.  - Avoid late meals - Lying down with a full stomach can make reflux worse. Try to plan meals for at least 2 to 3 hours before bedtime.  - Avoid tight clothing - Some people feel better if they wear  comfortable clothing that does not squeeze the stomach area.     4. Nonintractable headache, unspecified chronicity pattern, unspecified headache type  - Ambulatory referral/consult to Neurology; Future  - needs new referral.       Chronic conditions status updated as per HPI.  Other than changes above, cont current medications and maintain follow up with specialists.  No follow-ups on file.    Future Appointments   Date Time Provider Department Center   5/14/2025 10:10 AM LAB, [facility] [place] Adrienne Cotaya, FNP [facility]                       [1]   Social History  Tobacco Use    Smoking status: Never    Smokeless tobacco: Never   Substance Use Topics    Alcohol use: Yes     Alcohol/week: 3.0 standard drinks of alcohol     Types: 3 Glasses of wine per week     Comment: occasionally    Drug use: No   [2]   Outpatient Encounter Medications as of 5/14/2025   Medication Sig Dispense Refill    famotidine (PEPCID) 20 MG tablet Take 1 tablet (20 mg total) by mouth 2 (two) times daily. 60 tablet 1    pregabalin (LYRICA) 100 MG capsule Take 1 capsule (100 mg total) by mouth 3 (three) times daily. (Patient not taking: Reported on 5/14/2025) 90 capsule 0    [DISCONTINUED] pantoprazole (PROTONIX) 40 MG tablet Take 1 tablet (40 mg total) by mouth once daily. (Patient not taking: Reported on 5/14/2025) 30 tablet 1     Facility-Administered Encounter Medications as of 5/14/2025   Medication Dose Route Frequency Provider Last Rate Last Admin    0.9%  NaCl infusion   Intravenous Continuous Keyanna Barnes MD        sodium chloride 0.9% flush 10 mL  10 mL Intravenous PRN Keyanna Barnes MD

## 2025-05-15 ENCOUNTER — RESULTS FOLLOW-UP (OUTPATIENT)
Dept: PRIMARY CARE CLINIC | Facility: CLINIC | Age: 35
End: 2025-05-15